# Patient Record
Sex: FEMALE | Race: WHITE | Employment: UNEMPLOYED | URBAN - METROPOLITAN AREA
[De-identification: names, ages, dates, MRNs, and addresses within clinical notes are randomized per-mention and may not be internally consistent; named-entity substitution may affect disease eponyms.]

---

## 2017-02-09 ENCOUNTER — HOSPITAL ENCOUNTER (OUTPATIENT)
Age: 42
Setting detail: OUTPATIENT SURGERY
Discharge: HOME | End: 2017-02-09
Attending: OTOLARYNGOLOGY | Admitting: OTOLARYNGOLOGY
Payer: COMMERCIAL

## 2017-02-09 ENCOUNTER — ANESTHESIA (OUTPATIENT)
Dept: SURGERY | Age: 42
End: 2017-02-09
Payer: COMMERCIAL

## 2017-02-09 ENCOUNTER — ANESTHESIA EVENT (OUTPATIENT)
Dept: SURGERY | Age: 42
End: 2017-02-09
Payer: COMMERCIAL

## 2017-02-09 VITALS
HEIGHT: 68 IN | WEIGHT: 188 LBS | TEMPERATURE: 98.7 F | RESPIRATION RATE: 12 BRPM | SYSTOLIC BLOOD PRESSURE: 133 MMHG | BODY MASS INDEX: 28.49 KG/M2 | HEART RATE: 95 BPM | OXYGEN SATURATION: 95 % | DIASTOLIC BLOOD PRESSURE: 81 MMHG

## 2017-02-09 LAB — HCG UR QL: NEGATIVE

## 2017-02-09 PROCEDURE — 87205 SMEAR GRAM STAIN: CPT | Performed by: OTOLARYNGOLOGY

## 2017-02-09 PROCEDURE — 77030013079 HC BLNKT BAIR HGGR 3M -A: Performed by: ANESTHESIOLOGY

## 2017-02-09 PROCEDURE — 77030032490 HC SLV COMPR SCD KNE COVD -B: Performed by: OTOLARYNGOLOGY

## 2017-02-09 PROCEDURE — 77030006908 HC BLD SNUS SHV MEDT -D: Performed by: OTOLARYNGOLOGY

## 2017-02-09 PROCEDURE — 77030028681 HC DRSG NSL ABSRB NASOPORE STRY -C: Performed by: OTOLARYNGOLOGY

## 2017-02-09 PROCEDURE — 88305 TISSUE EXAM BY PATHOLOGIST: CPT | Performed by: OTOLARYNGOLOGY

## 2017-02-09 PROCEDURE — 77030018836 HC SOL IRR NACL ICUM -A: Performed by: OTOLARYNGOLOGY

## 2017-02-09 PROCEDURE — 74011000250 HC RX REV CODE- 250: Performed by: OTOLARYNGOLOGY

## 2017-02-09 PROCEDURE — 76210000016 HC OR PH I REC 1 TO 1.5 HR: Performed by: OTOLARYNGOLOGY

## 2017-02-09 PROCEDURE — 87186 SC STD MICRODIL/AGAR DIL: CPT | Performed by: OTOLARYNGOLOGY

## 2017-02-09 PROCEDURE — 77030008527 HC TBNG IRR DRL MEDT -B: Performed by: OTOLARYNGOLOGY

## 2017-02-09 PROCEDURE — 87075 CULTR BACTERIA EXCEPT BLOOD: CPT | Performed by: OTOLARYNGOLOGY

## 2017-02-09 PROCEDURE — 74011250637 HC RX REV CODE- 250/637: Performed by: OTOLARYNGOLOGY

## 2017-02-09 PROCEDURE — 77030018846 HC SOL IRR STRL H20 ICUM -A: Performed by: OTOLARYNGOLOGY

## 2017-02-09 PROCEDURE — 77030006907 HC BLD SNUS SHV MEDT -C: Performed by: OTOLARYNGOLOGY

## 2017-02-09 PROCEDURE — 77030031139 HC SUT VCRL2 J&J -A: Performed by: OTOLARYNGOLOGY

## 2017-02-09 PROCEDURE — 74011250636 HC RX REV CODE- 250/636: Performed by: ANESTHESIOLOGY

## 2017-02-09 PROCEDURE — 81025 URINE PREGNANCY TEST: CPT

## 2017-02-09 PROCEDURE — 77030002888 HC SUT CHRMC J&J -A: Performed by: OTOLARYNGOLOGY

## 2017-02-09 PROCEDURE — 77030013520 HC DEV INFL BLN ACCL -B: Performed by: OTOLARYNGOLOGY

## 2017-02-09 PROCEDURE — 74011250636 HC RX REV CODE- 250/636

## 2017-02-09 PROCEDURE — 77030002966 HC SUT PDS J&J -A: Performed by: OTOLARYNGOLOGY

## 2017-02-09 PROCEDURE — 77030008355 HC SPLNT NSL EXT MEDT -B: Performed by: OTOLARYNGOLOGY

## 2017-02-09 PROCEDURE — 76060000037 HC ANESTHESIA 3 TO 3.5 HR: Performed by: OTOLARYNGOLOGY

## 2017-02-09 PROCEDURE — 76210000021 HC REC RM PH II 0.5 TO 1 HR: Performed by: OTOLARYNGOLOGY

## 2017-02-09 PROCEDURE — 77030026438 HC STYL ET INTUB CARD -A: Performed by: ANESTHESIOLOGY

## 2017-02-09 PROCEDURE — 77030032979 HC IMPL ENT ETHM SNUS PROPL IENT -G: Performed by: OTOLARYNGOLOGY

## 2017-02-09 PROCEDURE — 77030011640 HC PAD GRND REM COVD -A: Performed by: OTOLARYNGOLOGY

## 2017-02-09 PROCEDURE — 77030011267 HC ELECTRD BLD COVD -A: Performed by: OTOLARYNGOLOGY

## 2017-02-09 PROCEDURE — 74011000250 HC RX REV CODE- 250

## 2017-02-09 PROCEDURE — 76010000173 HC OR TIME 3 TO 3.5 HR INTENSV-TIER 1: Performed by: OTOLARYNGOLOGY

## 2017-02-09 PROCEDURE — 77030002974 HC SUT PLN J&J -A: Performed by: OTOLARYNGOLOGY

## 2017-02-09 PROCEDURE — 77030036884 HC CATH GD SPNPLS RELV TIP DISP KT ACCL -G1: Performed by: OTOLARYNGOLOGY

## 2017-02-09 PROCEDURE — 74011250637 HC RX REV CODE- 250/637

## 2017-02-09 PROCEDURE — 77030008684 HC TU ET CUF COVD -B: Performed by: ANESTHESIOLOGY

## 2017-02-09 DEVICE — PROPEL MINI SINUS IMPLANT
Type: IMPLANTABLE DEVICE | Site: SINUS | Status: FUNCTIONAL
Brand: PROPEL MINI

## 2017-02-09 RX ORDER — SUCCINYLCHOLINE CHLORIDE 20 MG/ML
INJECTION INTRAMUSCULAR; INTRAVENOUS AS NEEDED
Status: DISCONTINUED | OUTPATIENT
Start: 2017-02-09 | End: 2017-02-09 | Stop reason: HOSPADM

## 2017-02-09 RX ORDER — HYDROCODONE BITARTRATE AND ACETAMINOPHEN 5; 325 MG/1; MG/1
1 TABLET ORAL
Status: CANCELLED | OUTPATIENT
Start: 2017-02-09

## 2017-02-09 RX ORDER — SODIUM CHLORIDE 0.9 % (FLUSH) 0.9 %
5-10 SYRINGE (ML) INJECTION AS NEEDED
Status: DISCONTINUED | OUTPATIENT
Start: 2017-02-09 | End: 2017-02-09 | Stop reason: HOSPADM

## 2017-02-09 RX ORDER — LIDOCAINE HYDROCHLORIDE AND EPINEPHRINE 10; 10 MG/ML; UG/ML
INJECTION, SOLUTION INFILTRATION; PERINEURAL AS NEEDED
Status: DISCONTINUED | OUTPATIENT
Start: 2017-02-09 | End: 2017-02-09 | Stop reason: HOSPADM

## 2017-02-09 RX ORDER — SODIUM CHLORIDE, SODIUM LACTATE, POTASSIUM CHLORIDE, CALCIUM CHLORIDE 600; 310; 30; 20 MG/100ML; MG/100ML; MG/100ML; MG/100ML
100 INJECTION, SOLUTION INTRAVENOUS CONTINUOUS
Status: CANCELLED | OUTPATIENT
Start: 2017-02-09 | End: 2017-02-10

## 2017-02-09 RX ORDER — CLINDAMYCIN PHOSPHATE 900 MG/50ML
INJECTION INTRAVENOUS AS NEEDED
Status: DISCONTINUED | OUTPATIENT
Start: 2017-02-09 | End: 2017-02-09 | Stop reason: HOSPADM

## 2017-02-09 RX ORDER — CLINDAMYCIN PHOSPHATE 900 MG/50ML
900 INJECTION INTRAVENOUS ONCE
Status: DISCONTINUED | OUTPATIENT
Start: 2017-02-09 | End: 2017-02-09 | Stop reason: HOSPADM

## 2017-02-09 RX ORDER — AMOXICILLIN AND CLAVULANATE POTASSIUM 875; 125 MG/1; MG/1
1 TABLET, FILM COATED ORAL 2 TIMES DAILY
Qty: 14 TAB | Refills: 0 | Status: SHIPPED | OUTPATIENT
Start: 2017-02-09 | End: 2022-11-01

## 2017-02-09 RX ORDER — ONDANSETRON 2 MG/ML
INJECTION INTRAMUSCULAR; INTRAVENOUS AS NEEDED
Status: DISCONTINUED | OUTPATIENT
Start: 2017-02-09 | End: 2017-02-09 | Stop reason: HOSPADM

## 2017-02-09 RX ORDER — MIDAZOLAM HYDROCHLORIDE 1 MG/ML
INJECTION, SOLUTION INTRAMUSCULAR; INTRAVENOUS AS NEEDED
Status: DISCONTINUED | OUTPATIENT
Start: 2017-02-09 | End: 2017-02-09 | Stop reason: HOSPADM

## 2017-02-09 RX ORDER — SODIUM CHLORIDE 0.9 % (FLUSH) 0.9 %
5-10 SYRINGE (ML) INJECTION EVERY 8 HOURS
Status: DISCONTINUED | OUTPATIENT
Start: 2017-02-09 | End: 2017-02-09 | Stop reason: HOSPADM

## 2017-02-09 RX ORDER — SODIUM CHLORIDE 0.9 % (FLUSH) 0.9 %
5-10 SYRINGE (ML) INJECTION AS NEEDED
Status: CANCELLED | OUTPATIENT
Start: 2017-02-09

## 2017-02-09 RX ORDER — ROPIVACAINE HYDROCHLORIDE 5 MG/ML
150 INJECTION, SOLUTION EPIDURAL; INFILTRATION; PERINEURAL AS NEEDED
Status: DISCONTINUED | OUTPATIENT
Start: 2017-02-09 | End: 2017-02-09 | Stop reason: HOSPADM

## 2017-02-09 RX ORDER — HYDROCODONE BITARTRATE AND ACETAMINOPHEN 7.5; 325 MG/1; MG/1
2 TABLET ORAL
Qty: 30 TAB | Refills: 0 | Status: SHIPPED | OUTPATIENT
Start: 2017-02-09 | End: 2022-11-01

## 2017-02-09 RX ORDER — DEXAMETHASONE SODIUM PHOSPHATE 4 MG/ML
INJECTION, SOLUTION INTRA-ARTICULAR; INTRALESIONAL; INTRAMUSCULAR; INTRAVENOUS; SOFT TISSUE AS NEEDED
Status: DISCONTINUED | OUTPATIENT
Start: 2017-02-09 | End: 2017-02-09 | Stop reason: HOSPADM

## 2017-02-09 RX ORDER — FENTANYL CITRATE 50 UG/ML
25 INJECTION, SOLUTION INTRAMUSCULAR; INTRAVENOUS
Status: DISCONTINUED | OUTPATIENT
Start: 2017-02-09 | End: 2017-02-10 | Stop reason: HOSPADM

## 2017-02-09 RX ORDER — SODIUM CHLORIDE, SODIUM LACTATE, POTASSIUM CHLORIDE, CALCIUM CHLORIDE 600; 310; 30; 20 MG/100ML; MG/100ML; MG/100ML; MG/100ML
100 INJECTION, SOLUTION INTRAVENOUS CONTINUOUS
Status: DISCONTINUED | OUTPATIENT
Start: 2017-02-09 | End: 2017-02-10 | Stop reason: HOSPADM

## 2017-02-09 RX ORDER — MIDAZOLAM HYDROCHLORIDE 1 MG/ML
1 INJECTION, SOLUTION INTRAMUSCULAR; INTRAVENOUS AS NEEDED
Status: DISCONTINUED | OUTPATIENT
Start: 2017-02-09 | End: 2017-02-09 | Stop reason: HOSPADM

## 2017-02-09 RX ORDER — HYDROMORPHONE HYDROCHLORIDE 1 MG/ML
0.2 INJECTION, SOLUTION INTRAMUSCULAR; INTRAVENOUS; SUBCUTANEOUS
Status: DISCONTINUED | OUTPATIENT
Start: 2017-02-09 | End: 2017-02-10 | Stop reason: HOSPADM

## 2017-02-09 RX ORDER — SODIUM CHLORIDE 0.9 % (FLUSH) 0.9 %
5-10 SYRINGE (ML) INJECTION AS NEEDED
Status: DISCONTINUED | OUTPATIENT
Start: 2017-02-09 | End: 2017-02-10 | Stop reason: HOSPADM

## 2017-02-09 RX ORDER — PROPOFOL 10 MG/ML
INJECTION, EMULSION INTRAVENOUS AS NEEDED
Status: DISCONTINUED | OUTPATIENT
Start: 2017-02-09 | End: 2017-02-09 | Stop reason: HOSPADM

## 2017-02-09 RX ORDER — HYDROCODONE BITARTRATE AND ACETAMINOPHEN 7.5; 325 MG/1; MG/1
1 TABLET ORAL ONCE
Status: COMPLETED | OUTPATIENT
Start: 2017-02-09 | End: 2017-02-09

## 2017-02-09 RX ORDER — SODIUM CHLORIDE 0.9 % (FLUSH) 0.9 %
5-10 SYRINGE (ML) INJECTION EVERY 8 HOURS
Status: CANCELLED | OUTPATIENT
Start: 2017-02-09

## 2017-02-09 RX ORDER — ROCURONIUM BROMIDE 10 MG/ML
INJECTION, SOLUTION INTRAVENOUS AS NEEDED
Status: DISCONTINUED | OUTPATIENT
Start: 2017-02-09 | End: 2017-02-09 | Stop reason: HOSPADM

## 2017-02-09 RX ORDER — HYDROCODONE BITARTRATE AND ACETAMINOPHEN 7.5; 325 MG/1; MG/1
TABLET ORAL
Status: COMPLETED
Start: 2017-02-09 | End: 2017-02-09

## 2017-02-09 RX ORDER — SODIUM CHLORIDE, SODIUM LACTATE, POTASSIUM CHLORIDE, CALCIUM CHLORIDE 600; 310; 30; 20 MG/100ML; MG/100ML; MG/100ML; MG/100ML
INJECTION, SOLUTION INTRAVENOUS
Status: DISCONTINUED | OUTPATIENT
Start: 2017-02-09 | End: 2017-02-09 | Stop reason: HOSPADM

## 2017-02-09 RX ORDER — ONDANSETRON 8 MG/1
8 TABLET, ORALLY DISINTEGRATING ORAL
Qty: 5 TAB | Refills: 1 | Status: SHIPPED | OUTPATIENT
Start: 2017-02-09 | End: 2022-11-01

## 2017-02-09 RX ORDER — SODIUM CHLORIDE, SODIUM LACTATE, POTASSIUM CHLORIDE, CALCIUM CHLORIDE 600; 310; 30; 20 MG/100ML; MG/100ML; MG/100ML; MG/100ML
100 INJECTION, SOLUTION INTRAVENOUS CONTINUOUS
Status: DISCONTINUED | OUTPATIENT
Start: 2017-02-09 | End: 2017-02-09 | Stop reason: HOSPADM

## 2017-02-09 RX ORDER — OXYMETAZOLINE HCL 0.05 %
SPRAY, NON-AEROSOL (ML) NASAL AS NEEDED
Status: DISCONTINUED | OUTPATIENT
Start: 2017-02-09 | End: 2017-02-09 | Stop reason: HOSPADM

## 2017-02-09 RX ORDER — BACITRACIN ZINC 500 UNIT/G
OINTMENT (GRAM) TOPICAL AS NEEDED
Status: DISCONTINUED | OUTPATIENT
Start: 2017-02-09 | End: 2017-02-09 | Stop reason: HOSPADM

## 2017-02-09 RX ORDER — DIPHENHYDRAMINE HYDROCHLORIDE 50 MG/ML
12.5 INJECTION, SOLUTION INTRAMUSCULAR; INTRAVENOUS AS NEEDED
Status: DISCONTINUED | OUTPATIENT
Start: 2017-02-09 | End: 2017-02-10 | Stop reason: HOSPADM

## 2017-02-09 RX ORDER — MORPHINE SULFATE 10 MG/ML
2 INJECTION, SOLUTION INTRAMUSCULAR; INTRAVENOUS
Status: DISCONTINUED | OUTPATIENT
Start: 2017-02-09 | End: 2017-02-10 | Stop reason: HOSPADM

## 2017-02-09 RX ORDER — LIDOCAINE HYDROCHLORIDE 10 MG/ML
0.1 INJECTION, SOLUTION EPIDURAL; INFILTRATION; INTRACAUDAL; PERINEURAL AS NEEDED
Status: DISCONTINUED | OUTPATIENT
Start: 2017-02-09 | End: 2017-02-09 | Stop reason: HOSPADM

## 2017-02-09 RX ORDER — FENTANYL CITRATE 50 UG/ML
INJECTION, SOLUTION INTRAMUSCULAR; INTRAVENOUS AS NEEDED
Status: DISCONTINUED | OUTPATIENT
Start: 2017-02-09 | End: 2017-02-09 | Stop reason: HOSPADM

## 2017-02-09 RX ORDER — LABETALOL HYDROCHLORIDE 5 MG/ML
INJECTION, SOLUTION INTRAVENOUS AS NEEDED
Status: DISCONTINUED | OUTPATIENT
Start: 2017-02-09 | End: 2017-02-09 | Stop reason: HOSPADM

## 2017-02-09 RX ORDER — MIDAZOLAM HYDROCHLORIDE 1 MG/ML
0.5 INJECTION, SOLUTION INTRAMUSCULAR; INTRAVENOUS
Status: DISCONTINUED | OUTPATIENT
Start: 2017-02-09 | End: 2017-02-10 | Stop reason: HOSPADM

## 2017-02-09 RX ORDER — MORPHINE SULFATE 2 MG/ML
2 INJECTION, SOLUTION INTRAMUSCULAR; INTRAVENOUS
Status: CANCELLED | OUTPATIENT
Start: 2017-02-09

## 2017-02-09 RX ORDER — FENTANYL CITRATE 50 UG/ML
50 INJECTION, SOLUTION INTRAMUSCULAR; INTRAVENOUS AS NEEDED
Status: DISCONTINUED | OUTPATIENT
Start: 2017-02-09 | End: 2017-02-09 | Stop reason: HOSPADM

## 2017-02-09 RX ADMIN — FENTANYL CITRATE 50 MCG: 50 INJECTION, SOLUTION INTRAMUSCULAR; INTRAVENOUS at 15:51

## 2017-02-09 RX ADMIN — FENTANYL CITRATE 25 MCG: 50 INJECTION, SOLUTION INTRAMUSCULAR; INTRAVENOUS at 19:35

## 2017-02-09 RX ADMIN — HYDROCODONE BITARTRATE AND ACETAMINOPHEN 1 TABLET: 7.5; 325 TABLET ORAL at 21:16

## 2017-02-09 RX ADMIN — LABETALOL HYDROCHLORIDE 5 MG: 5 INJECTION, SOLUTION INTRAVENOUS at 16:06

## 2017-02-09 RX ADMIN — FENTANYL CITRATE 50 MCG: 50 INJECTION, SOLUTION INTRAMUSCULAR; INTRAVENOUS at 16:02

## 2017-02-09 RX ADMIN — PROPOFOL 200 MG: 10 INJECTION, EMULSION INTRAVENOUS at 15:51

## 2017-02-09 RX ADMIN — ONDANSETRON 4 MG: 2 INJECTION INTRAMUSCULAR; INTRAVENOUS at 18:37

## 2017-02-09 RX ADMIN — FENTANYL CITRATE 25 MCG: 50 INJECTION, SOLUTION INTRAMUSCULAR; INTRAVENOUS at 19:56

## 2017-02-09 RX ADMIN — ROCURONIUM BROMIDE 10 MG: 10 INJECTION, SOLUTION INTRAVENOUS at 15:51

## 2017-02-09 RX ADMIN — CLINDAMYCIN PHOSPHATE 900 MG: 900 INJECTION INTRAVENOUS at 16:26

## 2017-02-09 RX ADMIN — DEXAMETHASONE SODIUM PHOSPHATE 8 MG: 4 INJECTION, SOLUTION INTRA-ARTICULAR; INTRALESIONAL; INTRAMUSCULAR; INTRAVENOUS; SOFT TISSUE at 16:12

## 2017-02-09 RX ADMIN — ROCURONIUM BROMIDE 40 MG: 10 INJECTION, SOLUTION INTRAVENOUS at 16:25

## 2017-02-09 RX ADMIN — SODIUM CHLORIDE, SODIUM LACTATE, POTASSIUM CHLORIDE, CALCIUM CHLORIDE: 600; 310; 30; 20 INJECTION, SOLUTION INTRAVENOUS at 15:30

## 2017-02-09 RX ADMIN — FENTANYL CITRATE 25 MCG: 50 INJECTION, SOLUTION INTRAMUSCULAR; INTRAVENOUS at 19:48

## 2017-02-09 RX ADMIN — FENTANYL CITRATE 25 MCG: 50 INJECTION, SOLUTION INTRAMUSCULAR; INTRAVENOUS at 20:03

## 2017-02-09 RX ADMIN — MIDAZOLAM HYDROCHLORIDE 2 MG: 1 INJECTION, SOLUTION INTRAMUSCULAR; INTRAVENOUS at 15:51

## 2017-02-09 RX ADMIN — SODIUM CHLORIDE, SODIUM LACTATE, POTASSIUM CHLORIDE, AND CALCIUM CHLORIDE 100 ML/HR: 600; 310; 30; 20 INJECTION, SOLUTION INTRAVENOUS at 15:39

## 2017-02-09 RX ADMIN — MORPHINE SULFATE 2 MG: 10 INJECTION, SOLUTION INTRAVENOUS at 20:18

## 2017-02-09 RX ADMIN — SUCCINYLCHOLINE CHLORIDE 140 MG: 20 INJECTION INTRAMUSCULAR; INTRAVENOUS at 15:51

## 2017-02-09 RX ADMIN — FENTANYL CITRATE 100 MCG: 50 INJECTION, SOLUTION INTRAMUSCULAR; INTRAVENOUS at 16:26

## 2017-02-09 NOTE — IP AVS SNAPSHOT
[address] 
[phone] Patient: Antionette Lynn MRN: [id number] :1975 You are allergic to the following Allergen Reactions Tape (Adhesive) Rash States has problem with steri strips Betadine (Povidone-Iodine) Rash Bactrim (Sulfamethoprim Ds) Rash Recent Documentation Height Weight BMI OB Status Smoking Status 1.727 m 85.3 kg 28.59 kg/m2 Having regular periods Never Smoker Unresulted Labs Order Current Status CULTURE, ANAEROBIC In process CULTURE, WOUND W GRAM STAIN In process Emergency Contacts Name Discharge Info Relation Home Work Mobile Ever Shepard DISCHARGE CAREGIVER [3] Spouse [3] [phone] About your hospitalization You were admitted on:  2017 [facility] on:  2017 Unit phone number:  [phone] Why you were hospitalized Your primary diagnosis was:  Not on File Providers Seen During Your Hospitalizations Provider Role Specialty Primary office phone Kvng Greenberg MD Attending Provider Otolaryngology [phone] Your Primary Care Physician (PCP) Primary Care Physician Office Phone Office Fax Mindy Melvin [phone] Follow-up Information Follow up With Details Comments Contact Info Jermaine Powell MD   [address] [facility] [address] 
[phone] Kvng Greenberg MD Schedule an appointment as soon as possible for a visit in 1 week(s)  [address] [place] [address] 
[phone] Current Discharge Medication List  
  
START taking these medications Dose & Instructions Dispensing Information Comments Morning Noon Evening Bedtime  
 amoxicillin-clavulanate 875-125 mg per tablet Commonly known as:  AUGMENTIN  
   
 Your next dose is: Today, Tomorrow Other:  _________ Dose:  1 Tab Take 1 Tab by mouth two (2) times a day. Indications: post surgical  
 Quantity:  14 Tab Refills:  0 HYDROcodone-acetaminophen 7.5-325 mg per tablet Commonly known as:  Juanetta Rasp Your next dose is: Today, Tomorrow Other:  _________ Dose:  2 Tab Take 2 Tabs by mouth every six (6) hours as needed for Pain for up to 30 doses. Max Daily Amount: 8 Tabs. Indications: Pain Quantity:  30 Tab Refills:  0  
     
   
   
   
  
 ondansetron 8 mg disintegrating tablet Commonly known as:  ZOFRAN ODT Your next dose is: Today, Tomorrow Other:  _________ Dose:  8 mg Take 1 Tab by mouth every eight (8) hours as needed for Nausea. Quantity:  5 Tab Refills:  1 CONTINUE these medications which have NOT CHANGED Dose & Instructions Dispensing Information Comments Morning Noon Evening Bedtime  
 estrogens (conjugated) 0.45 mg tablet Commonly known as:  PREMARIN Your next dose is: Today, Tomorrow Other:  _________ Dose:  0.45 mg Take 0.45 mg by mouth nightly. TAKES FOR 25 DAYS EVERY MONTH Refills:  0  
     
   
   
   
  
 progesterone 100 mg capsule Commonly known as:  PROMETRIUM Your next dose is: Today, Tomorrow Other:  _________ Dose:  100 mg Take 100 mg by mouth. ONLY TAKES FOR 10 DAYS PER MONTH. TAKES AT BEDTIME. Refills:  0  
     
   
   
   
  
 ZOLOFT 100 mg tablet Generic drug:  sertraline Your next dose is: Today, Tomorrow Other:  _________ Dose:  200 mg Take 200 mg by mouth nightly. Refills:  0 ZyrTEC 10 mg Cap Generic drug:  Cetirizine Your next dose is: Today, Tomorrow Other:  _________ Dose:  10 mg Take 10 mg by mouth daily. Refills:  0 Where to Get Your Medications Information on where to get these meds will be given to you by the nurse or doctor. ! Ask your nurse or doctor about these medications  
  amoxicillin-clavulanate 875-125 mg per tablet HYDROcodone-acetaminophen 7.5-325 mg per tablet  
 ondansetron 8 mg disintegrating tablet Discharge Instructions Endoscopic Sinus Surgery: What to Expect at [facility] Your Recovery You will have a drip pad under your nose to collect mucus and blood. Change it only when it bleeds through. You may have to do this every hour for 24 hours after surgery. You may have some swelling of your nose, upper lip, cheeks, or around your eyes. Your nose may be sore and will bleed. You may feel \"stuffed up\" like you have a bad head cold. This will last for several days after surgery. The tip of your nose and your upper lip and gums may be numb. Feeling will return in a few weeks to a few months. Your sense of smell will not be as good after surgery. It will improve and probably return to normal in 1 to 2 months. You will probably be able to return to work or school in about 1 week and to your normal routine in about 3 weeks. However, this varies with your job and the extent of your surgery. Most people feel normal in 1 to 2 months. You will have to visit your doctor regularly for 3 to 4 months after your surgery. Your doctor will check to see that your sinuses are healing well. This care sheet gives you a general idea about how long it will take for you to recover. But each person recovers at a different pace. Follow the steps below to get better as quickly as possible. How can you care for yourself at home? Activity · Rest when you feel tired. Getting enough sleep will help you recover. Do not lie flat. Raise your head with three or four pillows. This can reduce swelling. Try to sleep on your back during the month after surgery. You can also sleep in a reclining chair. · Try to walk each day. Start by walking a little more than you did the day before. Bit by bit, increase the amount you walk. Walking boosts blood flow and helps prevent pneumonia and constipation. Also, try to sit and stand as much as you can. · For 1 week, try not to bend over or lift anything heavier than 10 pounds. This may include a child, heavy grocery bags and milk containers, a heavy briefcase or backpack, cat litter or dog food bags, or a vacuum . · You can take a shower or bath. Use lukewarm, not hot water. Avoid swimming for 6 weeks. · Avoid sawdust, chemicals, and excessive dust for 4 weeks. · Avoid strenuous activities, such as biking, jogging, weight lifting, or aerobic exercise, for 1 week and then ease back into these activities over 2 to 3 weeks. · You may drive when you are no longer taking prescription pain medicine and feel up to it. · You will probably be able to return to work or school in about 1 week and to your normal routine in about 3 weeks. However, this varies with your job and the extent of your surgery. Diet · You can eat your normal diet. If your stomach is upset, try bland, low-fat foods like plain rice, broiled chicken, toast, and yogurt. · You may notice that your bowel movements are not regular right after your surgery. This is common. Try to avoid constipation and straining with bowel movements. You may want to take a fiber supplement every day. If you have not had a bowel movement after a couple of days, ask your doctor about taking a mild laxative. Medicines · Your doctor will tell you if and when you can restart your medicines. He or she will also give you instructions about taking any new medicines. · If you take blood thinners, such as warfarin (Coumadin), clopidogrel (Plavix), or aspirin, be sure to talk to your doctor. He or she will tell you if and when to start taking those medicines again.  Make sure that you understand exactly what your doctor wants you to do. · Do not take aspirin, aspirin-containing medicines, or anti-inflammatory medicines such as ibuprofen (Advil, Motrin) or naproxen (Aleve) for 3 weeks following surgery unless your doctor says it is okay. · Take pain medicines exactly as directed. ¨ If the doctor gave you a prescription medicine for pain, take it as prescribed. ¨ Do not take two or more pain medicines at the same time unless the doctor told you to. Many pain medicines have acetaminophen, which is Tylenol. Too much acetaminophen (Tylenol) can be harmful. · If your doctor prescribed antibiotics, take them as directed. Do not stop taking them just because you feel better. You need to take the full course of antibiotics. · If you think your pain medicine is making you sick to your stomach: 
¨ Take your medicine after meals (unless your doctor has told you not to). ¨ Ask your doctor for a different pain medicine. Incision care · You probably will not have an incision (cut). You will have a drip pad under your nose to collect blood. Change it only when it has bled through. You may have to do this every hour for 24 hours after surgery. When bleeding stops, you can remove it. · If you have packing in your nose, leave it in. Your doctor will take it out. Ice and elevation · To help with swelling and pain, put ice or a cold pack on your nose for 10 to 20 minutes at a time. Put a thin cloth between the ice and your skin. · Do not sleep flat. Sleep with your head raised up. You can also sleep in a reclining chair. Other instructions · Do not blow your nose for 2 weeks. · Do not put anything into your nose. · If you must sneeze, open your mouth and sneeze naturally. · Keep your mouth clean. Rinse your mouth with salt water or an alcohol-free mouthwash after each meal and before bedtime.  
· After any packing is removed, use saline (saltwater) nasal washes to help keep your nasal passages open and wash out mucus and bacteria. You can buy saline nose drops at a grocery store or drugstore. · You can wear your glasses when you wish. Do not wear contacts until the day after the surgery. · Do not travel by airplane for at least 2 weeks. Your sinuses are still healing, and the changes in air pressure can affect them. Follow-up care is a key part of your treatment and safety. Be sure to make and go to all appointments, and call your doctor if you are having problems. It's also a good idea to know your test results and keep a list of the medicines you take. When should you call for help? Call 911 anytime you think you may need emergency care. For example, call if: 
· You passed out (lost consciousness). · You have severe trouble breathing. Call your doctor now or seek immediate medical care if: · The dressing soaks through with blood and needs to be changed more than every 15 minutes. · You have a fever with a stiff neck or a severe headache. · You are sensitive to light, or you feel very sleepy or confused. · You have pain that does not get better after you take pain medicine. · You have a fever over 100°F. 
· You have double or blurred vision, cannot close your eyes, or have eye pain. Watch closely for changes in your health, and be sure to contact your doctor if: 
· You do not have a bowel movement after taking a laxative. Where can you learn more? Go to http://jovanny-jesus.info/. Enter V355 in the search box to learn more about \"Endoscopic Sinus Surgery: What to Expect at Home. \" Current as of: July 29, 2016 Content Version: 11.1 © 1873-4499 [employer]. Care instructions adapted under license by [employer] (which disclaims liability or warranty for this information).  If you have questions about a medical condition or this instruction, always ask your healthcare professional. [employer] disclaims any warranty or liability for your use of this information. Rhinoplasty: What to Expect at [facility] Your Recovery Rhinoplasty is surgery to reshape your nose. It can be done to improve your appearance, fix a birth defect, or help you breathe better. You may have stitches or staples in your cuts (incisions). Stitches inside your nose and mouth will usually dissolve on their own. If you have staples, your doctor will take these out in the first week. A bandage will cover your nose. You may have a plastic or plaster splint to protect and help keep the new shape of your nose. You may have a \"nasal drip pad\" under your nostrils to collect any blood that may drip from your nose. Your doctor will show you how to change the pad as needed. You may have packing material inside your nose to reduce bleeding and swelling. Packing and the nasal drip pad will be removed within 2 days after surgery. The splint will be removed in about a week. Your nose will be bruised and swollen, and you may get dark bruises around your eyes. The swelling may get worse before it gets better. Most of the swelling should go away in 3 to 4 weeks. You will have some pain in your nose, and you may have a headache. Your nose may be stuffy and you may have trouble breathing for a short time. The skin on the tip of your nose may be numb. You may have some itching or shooting pain as the feeling returns. If bones were broken during your surgery, you will need to avoid injury to your nose for about 3 months. In 3 to 4 weeks, you should have a good idea as to what your nose will look like. It can take up to a year to see the final result. This care sheet gives you a general idea about how long it will take for you to recover. But each person recovers at a different pace. Follow the steps below to feel better as quickly as possible. How can you care for yourself at home? Activity · Rest when you feel tired. Getting enough sleep will help you recover. · Keep your head raised for several days after surgery. Sleep with your head up by using 2 or 3 pillows. · Try to walk each day. Start by walking a little more than you did the day before. Bit by bit, increase the amount you walk. · You will probably need to take about 1 week off from work. It depends on the type of work you do and how you feel. · Avoid strenuous activities, such as bicycle riding, jogging, weight lifting, or aerobic exercise, for 2 to 3 weeks or until your doctor says it is okay. · Ask your doctor when you can drive again. · Do not blow your nose for at least 1 week after surgery. Wipe your nose gently with a tissue. If you need to sneeze, do it with your mouth open. · Ask your doctor when it'll be okay for you to bend over. · Do not rub your nose for 8 weeks. Use sunblock on your nose and wear a hat with a brim to avoid getting a sunburn. Put on sunblock or makeup gently. · Do not swim for a week. Diet · You may notice that your bowel movements are not regular right after your surgery. This is common. Try to avoid constipation and straining with bowel movements. You may want to take a fiber supplement every day. If you have not had a bowel movement after a couple of days, ask your doctor about taking a mild laxative. Medicines · Your doctor will tell you if and when you can restart your medicines. He or she will also give you instructions about taking any new medicines. · If you take blood thinners, such as warfarin (Coumadin), clopidogrel (Plavix), or aspirin, be sure to talk to your doctor. He or she will tell you if and when to start taking those medicines again. Make sure that you understand exactly what your doctor wants you to do. · Be safe with medicines. Take pain medicines exactly as directed. ¨ If the doctor gave you a prescription medicine for pain, take it as prescribed. ¨ If you are not taking a prescription pain medicine, ask your doctor if you can take an over-the-counter medicine. · If your doctor prescribed antibiotics, take them as directed. Do not stop taking them just because you feel better. You need to take the full course of antibiotics. · If you think your pain medicine is making you sick to your stomach: 
¨ Take your medicine after meals (unless your doctor has told you not to). ¨ Ask your doctor for a different pain medicine. Incision care · After the stitches or staples are out, you may wash the incision with soap and water and gently dry the area. · If you have strips of tape on the incision the doctor made, leave the tape on for a week or until it falls off. Or follow your doctor's instructions for removing the tape. Other instructions · Put ice or a cold pack on your nose for 10 to 20 minutes at a time. Try to do this every 1 to 2 hours for the next 3 days (when you are awake) or until the swelling goes down. A bag of frozen peas or corn works well for this, because it molds to the shape of your face. Put a thin cloth between the ice and your skin. · Do not set glasses on your nose for 4 weeks. Instead, wrap a piece of tape around the bridge of the glasses and attach the tape to your forehead. · For 1 week, avoid wearing clothes that you pull over your head. Follow-up care is a key part of your treatment. Be sure to make and go to all appointments, and call your doctor if you are having problems. It's also a good idea to know your test results and keep a list of the medicines you take. When should you call for help? Call 911 anytime you think you may need emergency care. For example, call if: 
· You passed out (lost consciousness). · You have severe trouble breathing. · You have sudden chest pain and shortness of breath, or you cough up blood. Call your doctor now or seek immediate medical care if: · You have pain that does not get better after you take pain medicine. · You have a fever over 100°F. 
· You have loose stitches, or your incision comes open. · You have bright red blood that repeatedly soaks through the nasal drip pad, or you feel that you are swallowing a lot of blood. · You have signs of infection, such as red streaks or pus from your incision. · You have a sudden increase in swelling. · You are sick to your stomach or cannot keep fluids down. Watch closely for any changes in your health, and be sure to contact your doctor if: 
· You do not have a bowel movement after taking a laxative. Where can you learn more? Go to http://jovanny-jesus.info/. Enter U430 in the search box to learn more about \"Rhinoplasty: What to Expect at Home. \" Current as of: February 5, 2016 Content Version: 11.1 © 3955-5234 [employer]. Care instructions adapted under license by [employer] (which disclaims liability or warranty for this information). If you have questions about a medical condition or this instruction, always ask your healthcare professional. [place] [address] any warranty or liability for your use of this information. DISCHARGE SUMMARY from Nurse The following personal items are in your possession at time of discharge: 
 
Dental Appliances: Other (comment) (upper front 4 crowns, bottom right crown molar) Clothing: Other (comment) (clothing) PATIENT INSTRUCTIONS: 
 
 
F-face looks uneven A-arms unable to move or move unevenly S-speech slurred or non-existent T-time-call 911 as soon as signs and symptoms begin-DO NOT go  
 Back to bed or wait to see if you get better-TIME IS BRAIN. Warning Signs of HEART ATTACK Call 911 if you have these symptoms: 
? Chest discomfort. Most heart attacks involve discomfort in the center of the chest that lasts more than a few minutes, or that goes away and comes back. It can feel like uncomfortable pressure, squeezing, fullness, or pain. ? Discomfort in other areas of the upper body. Symptoms can include pain or discomfort in one or both arms, the back, neck, jaw, or stomach. ? Shortness of breath with or without chest discomfort. ? Other signs may include breaking out in a cold sweat, nausea, or lightheadedness. Don't wait more than five minutes to call [address]! Fast action can save your life. Calling 911 is almost always the fastest way to get lifesaving treatment. Emergency Medical Services staff can begin treatment when they arrive  up to an hour sooner than if someone gets to the hospital by car. The discharge information has been reviewed with the {PATIENT PARENT GUARDIAN:41879}. The {PATIENT PARENT GUARDIAN:91501} verbalized understanding. Discharge medications reviewed with the {Dishcarge meds reviewed RNET:57298} and appropriate educational materials and side effects teaching were provided. Discharge Orders None Introducing [facility]! Garland Wells introduces [employer] patient portal. Now you can access parts of your medical record, email your doctor's office, and request medication refills online. 1. In your internet browser, go to [email or web address] 2. Click on the First Time User? Click Here link in the Sign In box. You will see the New Member Sign Up page. 3. Enter your [employer] Access Code exactly as it appears below. You will not need to use this code after youve completed the sign-up process. If you do not sign up before the expiration date, you must request a new code. · [employer] Access Code: [email or web address] Expires: 5/7/2017 11:28 AM 
 
4. Enter the last four digits of your Social Security Number (xxxx) and Date of Birth (mm/dd/yyyy) as indicated and click Submit. You will be taken to the next sign-up page. 5. Create a [employer] ID. This will be your [employer] login ID and cannot be changed, so think of one that is secure and easy to remember. 6. Create a [employer] password. You can change your password at any time. 7. Enter your Password Reset Question and Answer. This can be used at a later time if you forget your password. 8. Enter your e-mail address. You will receive e-mail notification when new information is available in [address] 9. Click Sign Up. You can now view and download portions of your medical record. 10. Click the Download Summary menu link to download a portable copy of your medical information. If you have questions, please visit the Frequently Asked Questions section of the [employer] website. Remember, [employer] is NOT to be used for urgent needs. For medical emergencies, dial 911. Now available from your iPhone and Android! General Information Please provide this summary of care documentation to your next provider. Patient Signature:  ____________________________________________________________ Date:  ____________________________________________________________  
  
Yung Mancini Provider Signature:  ____________________________________________________________ Date:  ____________________________________________________________

## 2017-02-09 NOTE — ANESTHESIA PREPROCEDURE EVALUATION
Anesthetic History   No history of anesthetic complications            Review of Systems / Medical History  Patient summary reviewed, nursing notes reviewed and pertinent labs reviewed    Pulmonary  Within defined limits                 Neuro/Psych   Within defined limits           Cardiovascular  Within defined limits                     GI/Hepatic/Renal  Within defined limits              Endo/Other  Within defined limits           Other Findings              Physical Exam    Airway  Mallampati: II  TM Distance: > 6 cm  Neck ROM: normal range of motion   Mouth opening: Normal     Cardiovascular  Regular rate and rhythm,  S1 and S2 normal,  no murmur, click, rub, or gallop             Dental    Dentition: Bridges     Pulmonary  Breath sounds clear to auscultation               Abdominal  GI exam deferred       Other Findings            Anesthetic Plan    ASA: 2  Anesthesia type: general          Induction: Intravenous  Anesthetic plan and risks discussed with: Patient

## 2017-02-10 NOTE — BRIEF OP NOTE
BRIEF OPERATIVE NOTE    Date of Procedure: 2/9/2017   Preoperative Diagnosis: CHRONIC SINUSITIS, POLYP OF NASAL CAVITY, COMPLETE UNILATERAL CLEFT PALATE WITH CLEFT LIP  Postoperative Diagnosis: CHRONIC SINUSITIS, POLYP OF NASAL CAVITY, COMPLETE UNILATERAL CLEFT PALATE     Procedure(s):  FUNCTIONAL ENDOSCOPIC SINUS SURGERY WITH LANDMARK, CLEFT LIP REPAIR SCAR REVISION, FUNCTIONAL RHINOPLASTY  SEPTORHINOPLASTY  Surgeon(s) and Role:     * Curly Benedict MD - Primary            Surgical Staff:  Circ-1: Maryse Fierro RN  Circ-Relief: Aleshia Gallegos RN  Scrub RN-1: Loan Durham RN  Scrub RN-Relief: Miller Hong RN  Surg Asst-1: Abelino Tovar  Surg Asst-2: [place] Cresencio  Float Staff: Miller Hong RN  Event Time In   Incision Start 1629   Incision Close 1907     Anesthesia: General   Estimated Blood Loss: 30ml  Specimens:   ID Type Source Tests Collected by Time Destination   1 : Right nasal biopsy Fresh Nasal  Curly Benedict MD 2/9/2017 1646 Pathology   2 : Right frontal sinus nasal polyp Fresh Nasal  Curly Benedict MD 2/9/2017 1647 Pathology   1 : Right Maxillary Sinus Sinus Maxillary Sinus CULTURE, ANAEROBIC AND AEROBIC Curly Benedict MD 2/9/2017 1634 Microbiology      Findings: polyp disease   Complications: 0  Implants:   Implant Name Type Inv.  Item Serial No.  Lot No. LRB No. Used Action   IMPL ENT MINI Vianne Signs --  - SNA  IMPL ENT MINI Vianne Signs --  NA INTERSECT ENT [id number] Right 1 Implanted   IMPL ENT MINI MOMETASN FUROATE --  - SNA   IMPL ENT MINI Vianne Signs --  NA INTERSECT ENT [id number] Left 1 Implanted

## 2017-02-10 NOTE — ROUTINE PROCESS
Patient: Kyra Sharp MRN: [id number]  SSN: [id number]   YOB: 1975  Age: 46 y.o. Sex: female     Patient is status post Procedure(s):  FUNCTIONAL ENDOSCOPIC SINUS SURGERY WITH LANDMARK, CLEFT LIP REPAIR SCAR REVISION, FUNCTIONAL RHINOPLASTY  SEPTORHINOPLASTY. Surgeon(s) and Role:     * Nash Yeboah MD - Primary    Local/Dose/Irrigation:  See [facility]                  Peripheral IV 02/09/17 Right Antecubital (Active)                           Dressing/Packing:  Wound Ear-DRESSING TYPE: Cotton ball(s); Adhesive wound closure strips (Steri-Strips) (bacitracin to cotton ball-placed in right ear; paper tape un) (02/09/17 1566)  Splint/Cast:  ]    Other:

## 2017-02-10 NOTE — ANESTHESIA POSTPROCEDURE EVALUATION
Post-Anesthesia Evaluation and Assessment    Patient: Clearance Aas MRN: [id number]  SSN: [id number]    YOB: 1975  Age: 46 y.o. Sex: female       Cardiovascular Function/Vital Signs  Visit Vitals    /81    Pulse 95    Temp 37.1 °C (98.7 °F)    Resp 12    Ht 5' 8\" (1.727 m)    Wt 85.3 kg (188 lb)    SpO2 95%    BMI 28.59 kg/m2       Patient is status post general anesthesia for Procedure(s):  FUNCTIONAL ENDOSCOPIC SINUS SURGERY WITH LANDMARK, CLEFT LIP REPAIR SCAR REVISION, FUNCTIONAL RHINOPLASTY  SEPTORHINOPLASTY. Nausea/Vomiting: None    Postoperative hydration reviewed and adequate. Pain:  Pain Scale 1: Numeric (0 - 10) (02/09/17 2137)  Pain Intensity 1: 4 (02/09/17 2137)   Managed    Neurological Status:   Neuro (WDL): Exceptions to WDL (02/09/17 2036)  Neuro  Neurologic State: Alert;Drowsy (02/09/17 2036)  Orientation Level: Oriented X4 (02/09/17 2036)  Cognition: Follows commands (02/09/17 2036)  Speech: Clear (02/09/17 2036)  LUE Motor Response: Purposeful (02/09/17 2036)  LLE Motor Response: Purposeful (02/09/17 2036)  RUE Motor Response: Purposeful (02/09/17 2036)  RLE Motor Response: Purposeful (02/09/17 2036)   At baseline    Mental Status and Level of Consciousness: Arousable    Pulmonary Status:   O2 Device: Room air (02/09/17 2137)   Adequate oxygenation and airway patent    Complications related to anesthesia: None    Post-anesthesia assessment completed.  No concerns    Signed By: Bernie Capone MD     February 9, 2017

## 2017-02-10 NOTE — DISCHARGE INSTRUCTIONS
Endoscopic Sinus Surgery: What to Expect at [address] will have a drip pad under your nose to collect mucus and blood. Change it only when it bleeds through. You may have to do this every hour for 24 hours after surgery. You may have some swelling of your nose, upper lip, cheeks, or around your eyes. Your nose may be sore and will bleed. You may feel \"stuffed up\" like you have a bad head cold. This will last for several days after surgery. The tip of your nose and your upper lip and gums may be numb. Feeling will return in a few weeks to a few months. Your sense of smell will not be as good after surgery. It will improve and probably return to normal in 1 to 2 months. You will probably be able to return to work or school in about 1 week and to your normal routine in about 3 weeks. However, this varies with your job and the extent of your surgery. Most people feel normal in 1 to 2 months. You will have to visit your doctor regularly for 3 to 4 months after your surgery. Your doctor will check to see that your sinuses are healing well. This care sheet gives you a general idea about how long it will take for you to recover. But each person recovers at a different pace. Follow the steps below to get better as quickly as possible. How can you care for yourself at home? Activity  · Rest when you feel tired. Getting enough sleep will help you recover. Do not lie flat. Raise your head with three or four pillows. This can reduce swelling. Try to sleep on your back during the month after surgery. You can also sleep in a reclining chair. · Try to walk each day. Start by walking a little more than you did the day before. Bit by bit, increase the amount you walk. Walking boosts blood flow and helps prevent pneumonia and constipation. Also, try to sit and stand as much as you can. · For 1 week, try not to bend over or lift anything heavier than 10 pounds.  This may include a child, heavy grocery bags and milk containers, a heavy briefcase or backpack, cat litter or dog food bags, or a vacuum . · You can take a shower or bath. Use lukewarm, not hot water. Avoid swimming for 6 weeks. · Avoid sawdust, chemicals, and excessive dust for 4 weeks. · Avoid strenuous activities, such as biking, jogging, weight lifting, or aerobic exercise, for 1 week and then ease back into these activities over 2 to 3 weeks. · You may drive when you are no longer taking prescription pain medicine and feel up to it. · You will probably be able to return to work or school in about 1 week and to your normal routine in about 3 weeks. However, this varies with your job and the extent of your surgery. Diet  · You can eat your normal diet. If your stomach is upset, try bland, low-fat foods like plain rice, broiled chicken, toast, and yogurt. · You may notice that your bowel movements are not regular right after your surgery. This is common. Try to avoid constipation and straining with bowel movements. You may want to take a fiber supplement every day. If you have not had a bowel movement after a couple of days, ask your doctor about taking a mild laxative. Medicines  · Your doctor will tell you if and when you can restart your medicines. He or she will also give you instructions about taking any new medicines. · If you take blood thinners, such as warfarin (Coumadin), clopidogrel (Plavix), or aspirin, be sure to talk to your doctor. He or she will tell you if and when to start taking those medicines again. Make sure that you understand exactly what your doctor wants you to do. · Do not take aspirin, aspirin-containing medicines, or anti-inflammatory medicines such as ibuprofen (Advil, Motrin) or naproxen (Aleve) for 3 weeks following surgery unless your doctor says it is okay. · Take pain medicines exactly as directed. ¨ If the doctor gave you a prescription medicine for pain, take it as prescribed.   ¨ Do not take two or more pain medicines at the same time unless the doctor told you to. Many pain medicines have acetaminophen, which is Tylenol. Too much acetaminophen (Tylenol) can be harmful. · If your doctor prescribed antibiotics, take them as directed. Do not stop taking them just because you feel better. You need to take the full course of antibiotics. · If you think your pain medicine is making you sick to your stomach:  ¨ Take your medicine after meals (unless your doctor has told you not to). ¨ Ask your doctor for a different pain medicine. Incision care  · You probably will not have an incision (cut). You will have a drip pad under your nose to collect blood. Change it only when it has bled through. You may have to do this every hour for 24 hours after surgery. When bleeding stops, you can remove it. · If you have packing in your nose, leave it in. Your doctor will take it out. Ice and elevation  · To help with swelling and pain, put ice or a cold pack on your nose for 10 to 20 minutes at a time. Put a thin cloth between the ice and your skin. · Do not sleep flat. Sleep with your head raised up. You can also sleep in a reclining chair. Other instructions  · Do not blow your nose for 2 weeks. · Do not put anything into your nose. · If you must sneeze, open your mouth and sneeze naturally. · Keep your mouth clean. Rinse your mouth with salt water or an alcohol-free mouthwash after each meal and before bedtime. · After any packing is removed, use saline (saltwater) nasal washes to help keep your nasal passages open and wash out mucus and bacteria. You can buy saline nose drops at a grocery store or drugstore. · You can wear your glasses when you wish. Do not wear contacts until the day after the surgery. · Do not travel by airplane for at least 2 weeks. Your sinuses are still healing, and the changes in air pressure can affect them. Follow-up care is a key part of your treatment and safety.  Be sure to make and go to all appointments, and call your doctor if you are having problems. It's also a good idea to know your test results and keep a list of the medicines you take. When should you call for help? Call 911 anytime you think you may need emergency care. For example, call if:  · You passed out (lost consciousness). · You have severe trouble breathing. Call your doctor now or seek immediate medical care if:  · The dressing soaks through with blood and needs to be changed more than every 15 minutes. · You have a fever with a stiff neck or a severe headache. · You are sensitive to light, or you feel very sleepy or confused. · You have pain that does not get better after you take pain medicine. · You have a fever over 100°F.  · You have double or blurred vision, cannot close your eyes, or have eye pain. Watch closely for changes in your health, and be sure to contact your doctor if:  · You do not have a bowel movement after taking a laxative. Where can you learn more? Go to http://jovanny-jesus.info/. Enter V305 in the search box to learn more about \"Endoscopic Sinus Surgery: What to Expect at Home. \"  Current as of: July 29, 2016  Content Version: 11.1  © 3196-9372 [employer]. Care instructions adapted under license by [employer] (which disclaims liability or warranty for this information). If you have questions about a medical condition or this instruction, always ask your healthcare professional. [place] [address] any warranty or liability for your use of this information. Rhinoplasty: What to Expect at [address] [place] [address] is surgery to reshape your nose. It can be done to improve your appearance, fix a birth defect, or help you breathe better. You may have stitches or staples in your cuts (incisions). Stitches inside your nose and mouth will usually dissolve on their own.  If you have staples, your doctor will take these out in the first week. A bandage will cover your nose. You may have a plastic or plaster splint to protect and help keep the new shape of your nose. You may have a \"nasal drip pad\" under your nostrils to collect any blood that may drip from your nose. Your doctor will show you how to change the pad as needed. You may have packing material inside your nose to reduce bleeding and swelling. Packing and the nasal drip pad will be removed within 2 days after surgery. The splint will be removed in about a week. Your nose will be bruised and swollen, and you may get dark bruises around your eyes. The swelling may get worse before it gets better. Most of the swelling should go away in 3 to 4 weeks. You will have some pain in your nose, and you may have a headache. Your nose may be stuffy and you may have trouble breathing for a short time. The skin on the tip of your nose may be numb. You may have some itching or shooting pain as the feeling returns. If bones were broken during your surgery, you will need to avoid injury to your nose for about 3 months. In 3 to 4 weeks, you should have a good idea as to what your nose will look like. It can take up to a year to see the final result. This care sheet gives you a general idea about how long it will take for you to recover. But each person recovers at a different pace. Follow the steps below to feel better as quickly as possible. How can you care for yourself at home? Activity  · Rest when you feel tired. Getting enough sleep will help you recover. · Keep your head raised for several days after surgery. Sleep with your head up by using 2 or 3 pillows. · Try to walk each day. Start by walking a little more than you did the day before. Bit by bit, increase the amount you walk. · You will probably need to take about 1 week off from work. It depends on the type of work you do and how you feel.   · Avoid strenuous activities, such as bicycle riding, jogging, weight lifting, or aerobic exercise, for 2 to 3 weeks or until your doctor says it is okay. · Ask your doctor when you can drive again. · Do not blow your nose for at least 1 week after surgery. Wipe your nose gently with a tissue. If you need to sneeze, do it with your mouth open. · Ask your doctor when it'll be okay for you to bend over. · Do not rub your nose for 8 weeks. Use sunblock on your nose and wear a hat with a brim to avoid getting a sunburn. Put on sunblock or makeup gently. · Do not swim for a week. Diet  · You may notice that your bowel movements are not regular right after your surgery. This is common. Try to avoid constipation and straining with bowel movements. You may want to take a fiber supplement every day. If you have not had a bowel movement after a couple of days, ask your doctor about taking a mild laxative. Medicines  · Your doctor will tell you if and when you can restart your medicines. He or she will also give you instructions about taking any new medicines. · If you take blood thinners, such as warfarin (Coumadin), clopidogrel (Plavix), or aspirin, be sure to talk to your doctor. He or she will tell you if and when to start taking those medicines again. Make sure that you understand exactly what your doctor wants you to do. · Be safe with medicines. Take pain medicines exactly as directed. ¨ If the doctor gave you a prescription medicine for pain, take it as prescribed. ¨ If you are not taking a prescription pain medicine, ask your doctor if you can take an over-the-counter medicine. · If your doctor prescribed antibiotics, take them as directed. Do not stop taking them just because you feel better. You need to take the full course of antibiotics. · If you think your pain medicine is making you sick to your stomach:  ¨ Take your medicine after meals (unless your doctor has told you not to). ¨ Ask your doctor for a different pain medicine.   Incision care  · After the stitches or staples are out, you may wash the incision with soap and water and gently dry the area. · If you have strips of tape on the incision the doctor made, leave the tape on for a week or until it falls off. Or follow your doctor's instructions for removing the tape. Other instructions  · Put ice or a cold pack on your nose for 10 to 20 minutes at a time. Try to do this every 1 to 2 hours for the next 3 days (when you are awake) or until the swelling goes down. A bag of frozen peas or corn works well for this, because it molds to the shape of your face. Put a thin cloth between the ice and your skin. · Do not set glasses on your nose for 4 weeks. Instead, wrap a piece of tape around the bridge of the glasses and attach the tape to your forehead. · For 1 week, avoid wearing clothes that you pull over your head. Follow-up care is a key part of your treatment. Be sure to make and go to all appointments, and call your doctor if you are having problems. It's also a good idea to know your test results and keep a list of the medicines you take. When should you call for help? Call 911 anytime you think you may need emergency care. For example, call if:  · You passed out (lost consciousness). · You have severe trouble breathing. · You have sudden chest pain and shortness of breath, or you cough up blood. Call your doctor now or seek immediate medical care if:  · You have pain that does not get better after you take pain medicine. · You have a fever over 100°F.  · You have loose stitches, or your incision comes open. · You have bright red blood that repeatedly soaks through the nasal drip pad, or you feel that you are swallowing a lot of blood. · You have signs of infection, such as red streaks or pus from your incision. · You have a sudden increase in swelling. · You are sick to your stomach or cannot keep fluids down.   Watch closely for any changes in your health, and be sure to contact your doctor if:  · You do not have a bowel movement after taking a laxative. Where can you learn more? Go to http://jovanny-jesus.info/. Enter R561 in the search box to learn more about \"Rhinoplasty: What to Expect at Home. \"  Current as of: February 5, 2016  Content Version: 11.1  © 4635-7251 [employer]. Care instructions adapted under license by [employer] (which disclaims liability or warranty for this information). If you have questions about a medical condition or this instruction, always ask your healthcare professional. [address] any warranty or liability for your use of this information. DISCHARGE SUMMARY from Nurse    The following personal items are in your possession at time of discharge:    Dental Appliances: Other (comment) (upper front 4 crowns, bottom right crown molar)              Clothing: Other (comment) (clothing)                PATIENT INSTRUCTIONS:    After general anesthesia or intravenous sedation, for 24 hours or while taking prescription Narcotics:  · Limit your activities  · Do not drive and operate hazardous machinery  · Do not make important personal or business decisions  · Do  not drink alcoholic beverages  · If you have not urinated within 8 hours after discharge, please contact your surgeon on call. Report the following to your surgeon:  · Excessive pain, swelling, redness or odor of or around the surgical area  · Temperature over 100.5  · Nausea and vomiting lasting longer than 4 hours or if unable to take medications  · Any signs of decreased circulation or nerve impairment to extremity: change in color, persistent  numbness, tingling, coldness or increase pain  · Any questions        What to do at Home:  Recommended activity: {discharge activity:37635}, ***    If you experience any of the following symptoms ***, please follow up with ***. *  Please give a list of your current medications to your Primary Care Provider.     *  Please update this list whenever your medications are discontinued, doses are      changed, or new medications (including over-the-counter products) are added. *  Please carry medication information at all times in case of emergency situations. These are general instructions for a healthy lifestyle:    No smoking/ No tobacco products/ Avoid exposure to second hand smoke    Surgeon General's Warning:  Quitting smoking now greatly reduces serious risk to your health. Obesity, smoking, and sedentary lifestyle greatly increases your risk for illness    A healthy diet, regular physical exercise & weight monitoring are important for maintaining a healthy lifestyle    You may be retaining fluid if you have a history of heart failure or if you experience any of the following symptoms:  Weight gain of 3 pounds or more overnight or 5 pounds in a week, increased swelling in our hands or feet or shortness of breath while lying flat in bed. Please call your doctor as soon as you notice any of these symptoms; do not wait until your next office visit. Recognize signs and symptoms of STROKE:    F-face looks uneven    A-arms unable to move or move unevenly    S-speech slurred or non-existent    T-time-call 911 as soon as signs and symptoms begin-DO NOT go       Back to bed or wait to see if you get better-TIME IS BRAIN. Warning Signs of HEART ATTACK     Call 911 if you have these symptoms:   Chest discomfort. Most heart attacks involve discomfort in the center of the chest that lasts more than a few minutes, or that goes away and comes back. It can feel like uncomfortable pressure, squeezing, fullness, or pain.  Discomfort in other areas of the upper body. Symptoms can include pain or discomfort in one or both arms, the back, neck, jaw, or stomach.  Shortness of breath with or without chest discomfort.  Other signs may include breaking out in a cold sweat, nausea, or lightheadedness.   Don't wait more than five minutes to call [address]! Fast action can save your life. Calling 911 is almost always the fastest way to get lifesaving treatment. Emergency Medical Services staff can begin treatment when they arrive -- up to an hour sooner than if someone gets to the hospital by car. The discharge information has been reviewed with the {PATIENT PARENT GUARDIAN:70674}. The {PATIENT PARENT GUARDIAN:36198} verbalized understanding. Discharge medications reviewed with the {Dishcarge meds reviewed LTTW:86522} and appropriate educational materials and side effects teaching were provided.

## 2017-02-11 LAB
BACTERIA SPEC CULT: NORMAL
SERVICE CMNT-IMP: NORMAL

## 2017-02-12 LAB
BACTERIA SPEC CULT: ABNORMAL
BACTERIA SPEC CULT: ABNORMAL
GRAM STN SPEC: ABNORMAL
GRAM STN SPEC: ABNORMAL
SERVICE CMNT-IMP: ABNORMAL

## 2017-03-10 NOTE — OP NOTES
[address] [place] [facility] [address], [address]   OP NOTE       Name:  Leno Hsu   MR#:  [id number]   :  1975   Account #:  [de-identified]    Surgery Date:  2017   Date of Adm:  2017       PREOPERATIVE DIAGNOSES   1. Chronic pansinusitis. 2. Pan nasal polyposis. 3. Cleft lip nose deformity. 4. Deformity of cleft lip repair. POSTOPERATIVE DIAGNOSES   1. Chronic pansinusitis. 2. Pan nasal polyposis. 3. Cleft lip nose deformity. 4. Deformity of cleft lip repair. PROCEDURES PERFORMED   1. Major cleft lip rhinoplasty revision. 2. Unilateral cleft lip reconstruction with deconstruction/reconstruction   procedure, full-thickness. 3. Bilateral endoscopic frontal sinusotomy with tissue removal.   4. Bilateral endoscopic total ethmoidectomy. 5. Nasal polypectomy with hemostasis control, bilateral.   6. Fusion image guidance x2 hours. SURGEON: Michelle Nguyen MD    ANESTHESIA: General endotracheal tube anesthesia. COMPLICATIONS: None. ESTIMATED BLOOD LOSS: 50 mL. IMPLANTS: Bilateral frontal and ethmoid sinus Propel steroid-eluting   stents. SPECIMENS REMOVED   1. Polyps. 2. Maxillary sinus was cultured. 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 The nose itself had a septal dislocation and relative   vestibular stenosis owing to collapse of the cleft cartilaginous   structures, for which cartilaginous grafting was appropriate for   functional reconstruction. Additionally, the patient has had chronic   persistent pansinusitis with nasal polyposis presenting for revision   sinus work. Previous surgery had demonstrated polypoid disease   obstructing the nasofrontal recess and the ethmoid cavity.  The   mucociliary highway of the maxillary sinuses had previously been seen   to be disrupted from a prior surgery done at an outside office. DESCRIPTION OF PROCEDURE: In the operating room, the patient   was induced under general endotracheal tube anesthesia, following   which she was prepped and draped in sterile fashion. Lidocaine 1%   with 1:100,000 parts epinephrine was used for local infiltration and   infraorbital foramen block and throughout the case. The Fusion image   guidance was set up and registered for overhead monitor use using a   0 and 30-degree endoscope. Each frontonasal recess was inspected,   removing remnant uncinate process with the microdebrider and angled   Thru-Cut forceps to open up this recess more widely. Draw forceps   were used to remove the eggshell base of each frontal sinus, having   first used the microdebrider and draw forceps to remove obstructing   nasal polyposis in this area. A balloon probe was then inserted with a   light reflex in the forehead, inserting each balloon to 10 atmospheres at   2 different levels in both frontal sinuses. There was mucopurulent drainage from each maxillary sinus, cultured   and washed out and irrigated with normal saline. The mucociliary   highway was seen at a surgical disruption on both sides superior and   posteriorly. The ethmoid cavities were next dissected using the microdebrider to   remove remnant cells at the ethmoid strut and along the skull base,   broaching the ground lamella to the posterior ethmoid sinuses,   plucking polyps individually with Blakesley and probing and uncapping   remnant cells. On completion, Propel implants were placed in the   frontal and ethmoid sinuses with slow steroid dilution and opening of   ducts. Cleft rhinoplasty was performed making an incision in the previous   incision,  curving across the lobe columella elevating the nasal envelope in   the subnasal SMAS plane. This was challenging because of previous   scar plane.  There was a supratip graft creating a  jaylyn beak deformity   that was shaved flush. This was later used as a cap graft for better tip   definition and symmetry. The lower lateral cartilages were dissected   from the nasal septum to reposition the subluxed cleft medial lata. Through the medial crura, the nasal septum was dissected, elevated   bilateral submucoperichondrial leaflets, trimming the dislocated   portion and sitting the septum in the midline and fixing it with a figure-  of-eight suture of 3-0 chromic. A tongue and groove technique was   then used to fix the medial crura to the caudal septum with 5-0 PDS   mattress sutures to create symmetry at the nasal base where the   asymmetry of this anatomy created a vestibular stenosis. The nasal   tips were then elevated using a lateral crural steal technique in figure-  of-eight suture 5-0 PDS, including box stitches with 5-0 PDS to create   symmetry of the domes creating definition with the cap graft with   interrupted 5-0 PDS. The nasal envelope was then relayed and closed   with interrupted 5-0 plain gut sutures. The major cleft lip repair was next performed, incising and beveling the   incision from the cleft scar coming around the nasal sill on the left and   around the ala and excising the previous scar. There was a step up at   the vermilion border  crossing across this into the red vermilion of the lip. Bilateral skin flaps were elevated sharply off the orbicularis oris   muscle. The muscle could be seen to be dehiscent under this. Sequential mattress sutures of 5-0 Vicryl were then placed to repair the   dehiscence muscle snug creating an everted edge to recreate a philtral   column. A rotation advancement flap was then performed creating   better length on the cleft lip side and closing the skin of the cleft lip in 2     layers with 5-0 Vicryl deep, everting the edge and interrupted 5-0 plain   gut sutures in the skin.  At the notch on the vermilion border a Z-plasty was performed interrupting the edges to address the whistle lip   deformity closing with interrupted 5-0 chromic gut sutures through to   the vermilion side. Mastisol, Steri-Strip and Thermoplastic cast was   applied to bridge of the nose. Antibiotic ointment was applied to the lip   and the patient was then handed over to Anesthesia for awakening   and transfer to the recovery room.         MD Josue Anguiano MP   D:  03/09/2017   18:12   T:  03/09/2017   19:36   Job #:  [id number]

## 2022-08-31 ENCOUNTER — HOSPITAL ENCOUNTER (OUTPATIENT)
Dept: PHYSICAL THERAPY | Age: 47
Discharge: HOME | End: 2022-08-31
Payer: COMMERCIAL

## 2022-08-31 PROCEDURE — 97161 PT EVAL LOW COMPLEX 20 MIN: CPT

## 2022-08-31 NOTE — THERAPY EVALUATION
[address] . [address] [place] [address], [address] [place] [address]  Phone: [phone]   Fax: [phone]    PT INITIAL EVALUATION NOTE - [facility] 2-15  Plan of Care/Statement of Necessity for Physical Therapy Services  2-15    Patient Name: Salena Agudelo  Date:2022  : 1975  [x]  Patient  Verified  Provider#: [id number]  Payor: ERNESTO CAMPOS / Plan: [address] [place] [address] / Product Type: PPO /    Referral source: Artem Sterling, Dave   In time:140  Out time:240  Total Treatment Time (min): 60  Total Timed Codes (min): 0     Visit #: 1      Start of Care: 22     Onset Date: recent recurrence     Treatment Area/Diagnosis: Other low back pain [M54.59]    SUBJECTIVE  Pain Level (0-10 scale): 4                Best: 2           Worst:  8    Any medication changes, allergies to medications, adverse drug reactions, diagnosis change, or new procedure performed?: [] No    [x] Yes (see summary sheet for update)    Subjective:    LBP MAINLY LEFT SI AREA  PLOF: NO PAIN  Mechanism of Injury: INSIDIOUS OVER THE YEARS. 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 GAIT, STAIRS, TOE/HEEL RAISES WNL. STAND ON ONE LEG AND FLEX KNEE WNL LEFT BUT RIGHT A LITTLE SHAKY. SUPINE; NO LLD. KNEE ALIGNMENT WNL. OVERPRESSURE ON KNEES WNL. PROM TO90 DEGREES BILAT. ARMS OVERHEAD WNL  PRONE; PROM HIPS AND KNEES WNL.  PALPATION; MILD TTP OVER LEFT SI.  SUPINE TO SIT UP HURTS QUITE A BIT            With   [x] TE   [] TA   [] neuro   [] other: Patient Education: [x] Review HEP  PULLOVERS, LTR  [] Progressed/Changed HEP based on:   [] positioning   [] body mechanics   [] transfers   [] heat/ice application    [] other:      TUG: wnl    Pain Level (0-10 scale) post treatment: 4    ASSESSMENT/Key Information/Changes in Function:   CORE STABILIZATION EXERCISES INDICATED AND WILL BE POC    Problem List: pain affecting function, decrease strength, and decrease activity tolerance    Short Term Goals: To be accomplished in 4 weeks:   INDEPENDENT IN HEP  Long Term Goals: To be accomplished in 8 weeks:   FULL ADLs UNRESTRICTED BY LBP  Patient Goal (s): REDUCE PAIN    Evaluation Complexity History LOW Complexity : Zero comorbidities / personal factors that will impact the outcome / POC; Examination LOW Complexity : 1-2 Standardized tests and measures addressing body structure, function, activity limitation and / or participation in recreation  ;Presentation LOW Complexity : Stable, uncomplicated  ;Clinical Decision Making MEDIUM Complexity : FOTO score of 26-74  Overall Complexity Rating: LOW      Patient / Family readiness to learn indicated by: asking questions  Persons(s) to be included in education: patient (P)  Barriers to Learning/Limitations: None  Patient Self Reported Health Status: good  Rehabilitation Potential: good  Patient/ Caregiver education and instruction: exercises      PLAN:  Treatment Plan may include any combination of the following: Therapeutic exercise, Therapeutic activities, Physical agent/modality, and Manual therapy  HEP Issued: PULLOVERS; LTRs    Frequency / Duration: Patient to be seen 2 times per week for 8 weeks. [x]  Plan of care has been reviewed with PTA    Certification Period: 8/31/22  to  11/30/22    Jaren Marrero, PT 8/31/2022     ________________________________________________________________________    I certify that the above Therapy Services are being furnished while the patient is under my care.  I agree with the treatment plan and certify that this therapy is necessary.     Physician's Signature:____________________  Date:____________Time: _________            Tamela Goss, *

## 2022-09-07 ENCOUNTER — HOSPITAL ENCOUNTER (OUTPATIENT)
Dept: PHYSICAL THERAPY | Age: 47
Discharge: HOME | End: 2022-09-07
Payer: COMMERCIAL

## 2022-09-07 PROCEDURE — 97110 THERAPEUTIC EXERCISES: CPT | Performed by: PHYSICAL THERAPIST

## 2022-09-07 NOTE — PROGRESS NOTES
PT DAILY TREATMENT NOTE - [facility] -15    Patient Name: Fanny Escalona  Date:2022  : 1975  [x]  Patient  Verified  Payor: BLUE CROSS / Plan: [address] [place] [address] / Product Type: PPO /    In time:330 pm   Out time:415 pm   Total Treatment Time (min): 45 minutes  Total Timed Codes (min): 38 minutes  1:1 Treatment Time ( only): 38 minutes. Visit #:  2    Treatment Area: Other low back pain [M54.59]    SUBJECTIVE  Pain Level (0-10 scale): 4  Any medication changes, allergies to medications, adverse drug reactions, diagnosis change, or new procedure performed?: [x] No    [] Yes (see summary sheet for update)  Subjective functional status/changes:   [] No changes reported  Left low back pain persists. MRI reading next week. Would like an injection. OBJECTIVE      38 min Therapeutic Exercise:  [] See flow sheet :   Rationale: increase ROM, increase strength, and improve coordination to improve the patients ability to perform routine upright activities without increasing symptoms. With   [] TE   [] TA   [] neuro   [] other: Patient Education: [x] Review HEP  , bridge + ball squeeze, reciprocals with OTB. [] Progressed/Changed HEP based on:   [] positioning   [] body mechanics   [] transfers   [] heat/ice application    [] other:      Other Objective/Functional Measures:      Pain Level (0-10 scale) post treatment: 3    ASSESSMENT/Changes in Function:   Patient reports she was fatigued from program.  No adverse effect. Needed verbal/physical cues to perform TA activation. Patient will continue to benefit from skilled PT services to modify and progress therapeutic interventions, address strength deficits, and assess and modify postural abnormalities to attain remaining goals. [x]  See Plan of Care  []  See progress note/recertification  []  See Discharge Summary         Progress towards goals / Updated goals:  Short Term Goals:  To be accomplished in 4 weeks: INDEPENDENT IN HEP  Long Term Goals:  To be accomplished in 8 weeks:              FULL ADLs UNRESTRICTED BY LBP  Patient Goal (s): REDUCE PAIN       PLAN  [x]  Upgrade activities as tolerated     [x]  Continue plan of care  []  Update interventions per flow sheet       []  Discharge due to:_  []  Other:_      April Asher-Gracie, PT 9/7/2022

## 2022-09-12 ENCOUNTER — TRANSCRIBE ORDER (OUTPATIENT)
Dept: SCHEDULING | Age: 47
End: 2022-09-12

## 2022-09-12 DIAGNOSIS — J32.9 CHRONIC SINUSITIS: Primary | ICD-10-CM

## 2022-09-14 ENCOUNTER — HOSPITAL ENCOUNTER (OUTPATIENT)
Dept: PHYSICAL THERAPY | Age: 47
Discharge: HOME | End: 2022-09-14
Payer: COMMERCIAL

## 2022-09-14 PROCEDURE — 97110 THERAPEUTIC EXERCISES: CPT | Performed by: PHYSICAL THERAPIST

## 2022-09-14 NOTE — PROGRESS NOTES
PT DAILY TREATMENT NOTE - [facility] 2-15    Patient Name: Anette Rolon  Date:2022  : 1975  [x]  Patient  Verified  Payor: ERNESTO ANDRES / Plan: [address] [place] [address] / Product Type: PPO /    In time:330 pm   Out time:415 pm   Total Treatment Time (min): 45 minutes  Total Timed Codes (min): 40 minutes  1:1 Treatment Time ( only): 40 minutes. Visit #:  3    Treatment Area: Other low back pain [M54.59]    SUBJECTIVE  Pain Level (0-10 scale): 4  Any medication changes, allergies to medications, adverse drug reactions, diagnosis change, or new procedure performed?: [x] No    [] Yes (see summary sheet for update)  Subjective functional status/changes:   [] No changes reported  Mild soreness after the last session. OBJECTIVE      40 min Therapeutic Exercise:  [] See flow sheet :   Rationale: increase ROM, increase strength, and improve coordination to improve the patients ability to perform routine upright activities without increasing symptoms. With   [] TE   [] TA   [] neuro   [] other: Patient Education: [x] Review HEP  , knee plank/hold  [] Progressed/Changed HEP based on:   [] positioning   [] body mechanics   [] transfers   [] heat/ice application    [] other:      Other Objective/Functional Measures:      Pain Level (0-10 scale) post treatment: 3    ASSESSMENT/Changes in Function:   Patient reports she was fatigued from program.  No adverse effect. Needed verbal/physical cues to perform TA activation. Patient will continue to benefit from skilled PT services to modify and progress therapeutic interventions, address strength deficits, and assess and modify postural abnormalities to attain remaining goals. [x]  See Plan of Care  []  See progress note/recertification  []  See Discharge Summary         Progress towards goals / Updated goals:  Short Term Goals: To be accomplished in 4 weeks:              INDEPENDENT IN HEP  Long Term Goals:  To be accomplished in 8 weeks: FULL ADLs UNRESTRICTED BY LBP  Patient Goal (s): REDUCE PAIN       PLAN  [x]  Upgrade activities as tolerated     [x]  Continue plan of care  []  Update interventions per flow sheet       []  Discharge due to:_  []  Other:_      April Asher-Gracie, PT 9/14/2022

## 2022-09-22 ENCOUNTER — HOSPITAL ENCOUNTER (OUTPATIENT)
Dept: PHYSICAL THERAPY | Age: 47
Discharge: HOME | End: 2022-09-22
Payer: COMMERCIAL

## 2022-09-22 PROCEDURE — 97110 THERAPEUTIC EXERCISES: CPT | Performed by: PHYSICAL THERAPIST

## 2022-09-22 NOTE — PROGRESS NOTES
PT DAILY TREATMENT NOTE - [facility] 2-15    Patient Name: Vero Peer  Date:2022  : 1975  [x]  Patient  Verified  Payor: ERNESTO Chaffee / Plan: [address] [place] [address] / Product Type: PPO /    In time:1115 pm   Out time:1215 pm   Total Treatment Time (min): 45 minutes  Total Timed Codes (min):  38 minutes    Visit #:  4    Treatment Area: Other low back pain [M54.59]    SUBJECTIVE  Pain Level (0-10 scale): 4  Any medication changes, allergies to medications, adverse drug reactions, diagnosis change, or new procedure performed?: [x] No    [] Yes (see summary sheet for update)  Subjective functional status/changes:   [] No changes reported  Saw the spine MD.  MRI was unremarkable with degenerative changes in the lumbar spine. Said to keep coming to PT. No other treatment options needed at this time. I have not been doing any exercises at home. OBJECTIVE      38 min Therapeutic Exercise:  [] See flow sheet :   Rationale: increase ROM, increase strength, and improve coordination to improve the patients ability to perform routine upright activities without increasing symptoms. With   [] TE   [] TA   [] neuro   [] other: Patient Education: [x] Review HEP  , knee plank/hold  [] Progressed/Changed HEP based on:   [] positioning   [] body mechanics   [] transfers   [] heat/ice application    [] other:      Other Objective/Functional Measures:      Pain Level (0-10 scale) post treatment: 0    ASSESSMENT/Changes in Function:   Patient reports she was fatigued from program.  No adverse effect. Needed verbal/physical cues to perform TA activation. Reports she must be pro active in doing HEP. Will be on vacation next week. Patient will continue to benefit from skilled PT services to modify and progress therapeutic interventions, address strength deficits, and assess and modify postural abnormalities to attain remaining goals.      [x]  See Plan of Care  []  See progress note/recertification  []  See Discharge Summary         Progress towards goals / Updated goals:  Short Term Goals: To be accomplished in 4 weeks:              INDEPENDENT IN HEP  Long Term Goals:  To be accomplished in 8 weeks:              FULL ADLs UNRESTRICTED BY LBP  Patient Goal (s): REDUCE PAIN       PLAN  [x]  Upgrade activities as tolerated     [x]  Continue plan of care  []  Update interventions per flow sheet       []  Discharge due to:_  []  Other:_      April Asher-Gracie, PT 9/22/2022

## 2022-10-05 ENCOUNTER — HOSPITAL ENCOUNTER (OUTPATIENT)
Dept: PHYSICAL THERAPY | Age: 47
Discharge: HOME | End: 2022-10-05
Payer: COMMERCIAL

## 2022-10-05 PROCEDURE — 97110 THERAPEUTIC EXERCISES: CPT | Performed by: PHYSICAL THERAPIST

## 2022-10-05 NOTE — PROGRESS NOTES
[address], [address] [facility]  Phone: [phone]   Fax: [phone]    Progress Note    Name: Neto Francisco   : 1975   MD: Dae Hall, *       Treatment Diagnosis: Other low back pain [M54.59]  Start of Care: 2022    Visits from Start of Care: 5  Missed Visits: 0    Summary of Care:  Core program for chronic low back pain     Assessment / Recommendations:     Progress towards goals / Updated goals:  Short Term Goals: To be accomplished in 4 weeks:              INDEPENDENT IN HEP, progressing   Long Term Goals: To be accomplished in 8 weeks:              FULL ADLs UNRESTRICTED BY LBP, progressing   Patient Goal (s): Ellie Loya, progressing           Other: continue current program.  Patient has missed time due to insurance and vacation.        Anna Manuel, PT 10/5/2022

## 2022-10-05 NOTE — PROGRESS NOTES
PT DAILY TREATMENT NOTE - [facility] 2-15    Patient Name: Sadiq Butt  CNOM:[id number]  : 1975  [x]  Patient  Verified  Payor: BLUE CROSS / Plan: [address] [place] [address] / Product Type: PPO /    In time:330  pm   Out time:515 pm   Total Treatment Time (min): 45 minutes  Total Timed Codes (min):  42 minutes    Visit #:  5    Treatment Area: Other low back pain [M54.59]    SUBJECTIVE  Pain Level (0-10 scale): 4  Any medication changes, allergies to medications, adverse drug reactions, diagnosis change, or new procedure performed?: [x] No    [] Yes (see summary sheet for update)  Subjective functional status/changes:   [] No changes reported  Patient reports that she been on vacation this past week. Did a lot of walking. OBJECTIVE      42 min Therapeutic Exercise:  [] See flow sheet :   Rationale: increase ROM, increase strength, and improve coordination to improve the patients ability to perform routine upright activities without increasing symptoms. With   [] TE   [] TA   [] neuro   [] other: Patient Education: [x] Review HEP  , knee plank/hold, TA with overhead reahc. [] Progressed/Changed HEP based on:   [] positioning   [] body mechanics   [] transfers   [] heat/ice application    [] other:      Other Objective/Functional Measures:      Pain Level (0-10 scale) post treatment: 0    ASSESSMENT/Changes in Function:   Patient reports she was fatigued from program.  No adverse effect. Needed verbal/physical cues to perform TA activation. Reports she must be pro active in doing HEP. Patient will continue to benefit from skilled PT services to modify and progress therapeutic interventions, address strength deficits, and assess and modify postural abnormalities to attain remaining goals. [x]  See Plan of Care  []  See progress note/recertification  []  See Discharge Summary         Progress towards goals / Updated goals:  Short Term Goals:  To be accomplished in 4 weeks: INDEPENDENT IN HEP  Long Term Goals:  To be accomplished in 8 weeks:              FULL ADLs UNRESTRICTED BY LBP  Patient Goal (s): REDUCE PAIN       PLAN  [x]  Upgrade activities as tolerated     [x]  Continue plan of care  []  Update interventions per flow sheet       []  Discharge due to:_  []  Other:_      April Asher-Gracie, PT 10/5/2022

## 2022-10-13 ENCOUNTER — HOSPITAL ENCOUNTER (OUTPATIENT)
Dept: PHYSICAL THERAPY | Age: 47
Discharge: HOME | End: 2022-10-13
Payer: COMMERCIAL

## 2022-10-13 PROCEDURE — 97110 THERAPEUTIC EXERCISES: CPT | Performed by: PHYSICAL THERAPIST

## 2022-10-13 NOTE — PROGRESS NOTES
PT DAILY TREATMENT NOTE - [facility] 2-15    Patient Name: Yandel Peterson  JSQV:  : 1975  [x]  Patient  Verified  Payor: ERNESTO ANDRES / Plan: [address] [place] [address] / Product Type: PPO /    In time: 930 am   Out time 1030 am   Total Treatment Time (min): 60 minutes  Total Timed Codes (min):  48 minutes    Visit #:  6    Treatment Area: Other low back pain [M54.59]    SUBJECTIVE  Pain Level (0-10 scale): 3  Any medication changes, allergies to medications, adverse drug reactions, diagnosis change, or new procedure performed?: [x] No    [] Yes (see summary sheet for update)  Subjective functional status/changes:   [] No changes reported    I did the knee plank and the TA at home. Not sure if I did them. OBJECTIVE      48 min Therapeutic Exercise:  [] See flow sheet :   Rationale: increase ROM, increase strength, and improve coordination to improve the patients ability to perform routine upright activities without increasing symptoms. With   [] TE   [] TA   [] neuro   [] other: Patient Education: [x] Review HEP  , knee plank/hold, TA with overhead reahc. [] Progressed/Changed HEP based on:   [] positioning   [] body mechanics   [] transfers   [] heat/ice application    [] other:      Other Objective/Functional Measures:      Pain Level (0-10 scale) post treatment: 1 , feels better after PT     ASSESSMENT/Changes in Function:   Patient reports she was fatigued from program.  No adverse effect. Needs a moderate amount verbal/physical cues to perform TA activation. Reports she must be pro active in doing HEP. Patient will continue to benefit from skilled PT services to modify and progress therapeutic interventions, address strength deficits, and assess and modify postural abnormalities to attain remaining goals. [x]  See Plan of Care  []  See progress note/recertification  []  See Discharge Summary         Progress towards goals / Updated goals:  Short Term Goals:  To be accomplished in 4 weeks:              INDEPENDENT IN HEP  Long Term Goals:  To be accomplished in 8 weeks:              FULL ADLs UNRESTRICTED BY LBP  Patient Goal (s): REDUCE PAIN       PLAN  [x]  Upgrade activities as tolerated     [x]  Continue plan of care  []  Update interventions per flow sheet       []  Discharge due to:_  []  Other:_      April Asher-Gracie, PT 10/13/2022

## 2022-10-19 ENCOUNTER — HOSPITAL ENCOUNTER (OUTPATIENT)
Dept: PHYSICAL THERAPY | Age: 47
Discharge: HOME | End: 2022-10-19
Payer: COMMERCIAL

## 2022-10-19 PROCEDURE — 97110 THERAPEUTIC EXERCISES: CPT | Performed by: PHYSICAL THERAPIST

## 2022-10-19 NOTE — PROGRESS NOTES
PT DAILY TREATMENT NOTE - [facility] 2-15    Patient Name: Jf Díaz  IRYT:  : 1975  [x]  Patient  Verified  Payor: ERNESTO ANDRES / Plan: [address] [place] [address] / Product Type: PPO /    In time: 330 pm    Out time 430 pm  Total Treatment Time (min): 60 minutes  Total Timed Codes (min):  42 minutes    Visit #:  7    Treatment Area: Other low back pain [M54.59]    SUBJECTIVE  Pain Level (0-10 scale): 3  Any medication changes, allergies to medications, adverse drug reactions, diagnosis change, or new procedure performed?: [x] No    [] Yes (see summary sheet for update)  Subjective functional status/changes:   [] No changes reported    I did not do any exercises at home. I know I need to make time. OBJECTIVE      42 min Therapeutic Exercise:  [] See flow sheet :   Rationale: increase ROM, increase strength, and improve coordination to improve the patients ability to perform routine upright activities without increasing symptoms. With   [] TE   [] TA   [] neuro   [] other: Patient Education: [x] Review HEP  , knee plank/hold, TA with overhead reach  [] Progressed/Changed HEP based on:   [] positioning   [] body mechanics   [] transfers   [] heat/ice application    [] other:      Other Objective/Functional Measures:      Pain Level (0-10 scale) post treatment: 2 , feels better after PT     ASSESSMENT/Changes in Function: Persistent inability to recall exercises from prior session. No home f/u  Patient reports she was fatigued from program.  No adverse effect. Needs a moderate amount verbal/physical cues to perform TA activation. Reports she must be pro active in doing HEP. Patient will continue to benefit from skilled PT services to modify and progress therapeutic interventions, address strength deficits, and assess and modify postural abnormalities to attain remaining goals.      [x]  See Plan of Care  []  See progress note/recertification  []  See Discharge Summary Progress towards goals / Updated goals:  Short Term Goals: To be accomplished in 4 weeks:              INDEPENDENT IN HEP  Long Term Goals:  To be accomplished in 8 weeks:              FULL ADLs UNRESTRICTED BY LBP  Patient Goal (s): REDUCE PAIN       PLAN  [x]  Upgrade activities as tolerated     [x]  Continue plan of care  []  Update interventions per flow sheet       []  Discharge due to:_  []  Other:_      April Asher-Gracie, PT 10/19/2022

## 2022-10-27 ENCOUNTER — HOSPITAL ENCOUNTER (OUTPATIENT)
Dept: PHYSICAL THERAPY | Age: 47
Discharge: HOME | End: 2022-10-27
Payer: COMMERCIAL

## 2022-10-27 PROCEDURE — 97110 THERAPEUTIC EXERCISES: CPT

## 2022-10-27 NOTE — PROGRESS NOTES
PT DAILY TREATMENT NOTE - [facility] 2-15    Patient Name: Sadiq Butt  BLPF:  : 1975  [x]  Patient  Verified  Payor: ERNESTO ANDRES / Plan: [address] [place] [address] / Product Type: PPO /    In time: 905 am    Out time 945 am  Total Treatment Time (min): 40 minutes  Total Timed Codes (min):  38 minutes    Visit #:  8    Treatment Area: Other low back pain [M54.59]    SUBJECTIVE  Pain Level (0-10 scale): 4  Any medication changes, allergies to medications, adverse drug reactions, diagnosis change, or new procedure performed?: [x] No    [] Yes (see summary sheet for update)  Subjective functional status/changes:   [] No changes reported  Frustrated that she doesn't feel like she has made progress. OBJECTIVE      38 min Therapeutic Exercise:  [] See flow sheet :Includes time spent observing response to exercise and discussing care. Rationale: increase ROM, increase strength, and improve coordination to improve the patients ability to perform routine upright activities without increasing symptoms. With   [x] TE   [] TA   [] neuro   [] other: Patient Education: [x] Review HEP  ,seated UE reach,   [] Progressed/Changed HEP based on:   [] positioning   [] body mechanics   [] transfers   [] heat/ice application    [] other:      Other Objective/Functional Measures:      Pain Level (0-10 scale) post treatment: 2 , feels better after PT     ASSESSMENT/Changes in Function: Frustrated with lack of progress. Needs to be  better with HEP - try new HEP. Better with TA activation. Patient will continue to benefit from skilled PT services to modify and progress therapeutic interventions, address strength deficits, and assess and modify postural abnormalities to attain remaining goals. [x]  See Plan of Care  []  See progress note/recertification  []  See Discharge Summary         Progress towards goals / Updated goals:  Short Term Goals:  To be accomplished in 4 weeks: INDEPENDENT IN HEP  Long Term Goals:  To be accomplished in 8 weeks:              FULL ADLs UNRESTRICTED BY LBP  Patient Goal (s): REDUCE PAIN       PLAN  [x]  Upgrade activities as tolerated     [x]  Continue plan of care  []  Update interventions per flow sheet       []  Discharge due to:_  []  Other:_      Reba Cherry, PT 10/27/2022

## 2022-10-27 NOTE — PROGRESS NOTES
PT PROGRESS NOTE - [facility] 2-15    Patient Name: Gena Quinn  WQBM:[id number]  : 1975  Visit #:  8    Treatment Area: Other low back pain [M54.59]    SUBJECTIVE  Pain Level (0-10 scale): 4  Frustrated that she doesn't feel like she has made progress. OBJECTIVE  Other Objective/Functional Measures:     Pain level remains consistent, but improves after PT session    Pain Level (0-10 scale) post treatment: 2 , feels better after PT     ASSESSMENT/Changes in Function: Frustrated with lack of progress. Needs to be  better with HEP - try new HEP. Better with TA activation. Patient will continue to benefit from skilled PT services to modify and progress therapeutic interventions, address strength deficits, and assess and modify postural abnormalities to attain remaining goals. [x]  See Plan of Care  []  See progress note/recertification  []  See Discharge Summary         Progress towards goals / Updated goals:  Short Term Goals: To be accomplished in 4 weeks:              INDEPENDENT IN HEP  Long Term Goals:  To be accomplished in 8 weeks:              FULL ADLs UNRESTRICTED BY LBP  Patient Goal (s): REDUCE PAIN       PLAN  [x]  Upgrade activities as tolerated     [x]  Continue plan of care  []  Update interventions per flow sheet       []  Discharge due to:_  []  Other:_      Sanchez Tate, PT 10/27/2022

## 2022-10-28 ENCOUNTER — HOSPITAL ENCOUNTER (OUTPATIENT)
Dept: CT IMAGING | Age: 47
Discharge: HOME | End: 2022-10-28
Attending: SPECIALIST
Payer: COMMERCIAL

## 2022-10-28 DIAGNOSIS — J32.9 CHRONIC SINUSITIS: ICD-10-CM

## 2022-10-28 PROCEDURE — 70486 CT MAXILLOFACIAL W/O DYE: CPT

## 2022-11-01 ENCOUNTER — HOSPITAL ENCOUNTER (OUTPATIENT)
Dept: PREADMISSION TESTING | Age: 47
Discharge: HOME | End: 2022-11-01
Payer: COMMERCIAL

## 2022-11-01 VITALS
DIASTOLIC BLOOD PRESSURE: 75 MMHG | WEIGHT: 216.49 LBS | SYSTOLIC BLOOD PRESSURE: 117 MMHG | OXYGEN SATURATION: 97 % | HEART RATE: 99 BPM | RESPIRATION RATE: 12 BRPM | HEIGHT: 68 IN | TEMPERATURE: 97.1 F | BODY MASS INDEX: 32.81 KG/M2

## 2022-11-01 LAB
ANION GAP SERPL CALC-SCNC: 4 MMOL/L (ref 5–15)
ATRIAL RATE: 81 BPM
BUN SERPL-MCNC: 16 MG/DL (ref 6–20)
BUN/CREAT SERPL: 18 (ref 12–20)
CALCIUM SERPL-MCNC: 8.7 MG/DL (ref 8.5–10.1)
CALCULATED P AXIS, ECG09: 46 DEGREES
CALCULATED R AXIS, ECG10: 13 DEGREES
CALCULATED T AXIS, ECG11: 29 DEGREES
CHLORIDE SERPL-SCNC: 104 MMOL/L (ref 97–108)
CO2 SERPL-SCNC: 32 MMOL/L (ref 21–32)
CREAT SERPL-MCNC: 0.91 MG/DL (ref 0.55–1.02)
DIAGNOSIS, 93000: NORMAL
GLUCOSE SERPL-MCNC: 97 MG/DL (ref 65–100)
P-R INTERVAL, ECG05: 164 MS
POTASSIUM SERPL-SCNC: 4.4 MMOL/L (ref 3.5–5.1)
Q-T INTERVAL, ECG07: 376 MS
QRS DURATION, ECG06: 82 MS
QTC CALCULATION (BEZET), ECG08: 436 MS
SODIUM SERPL-SCNC: 140 MMOL/L (ref 136–145)
VENTRICULAR RATE, ECG03: 81 BPM

## 2022-11-01 PROCEDURE — 93005 ELECTROCARDIOGRAM TRACING: CPT

## 2022-11-01 PROCEDURE — 36415 COLL VENOUS BLD VENIPUNCTURE: CPT

## 2022-11-01 PROCEDURE — 80048 BASIC METABOLIC PNL TOTAL CA: CPT

## 2022-11-01 RX ORDER — CLOMIPRAMINE HYDROCHLORIDE 25 MG/1
100 CAPSULE ORAL 2 TIMES DAILY
COMMUNITY

## 2022-11-01 RX ORDER — METOPROLOL SUCCINATE 25 MG/1
25 TABLET, EXTENDED RELEASE ORAL
COMMUNITY

## 2022-11-01 NOTE — PERIOP NOTES
[address], [address] [facility]   MAIN OR                                  [phone]   MAIN PRE OP                          [phone]                                                                                AMBULATORY PRE OP          [phone]  PRE-ADMISSION TESTING    [phone]     Surgery Date:  11/17/2022, Thursday       Is surgery arrival time given by surgeon? NO  If NO, [facility] staff will call you between 3 and 7pm the day before your surgery with your arrival time. (If your surgery is on a Monday, we will call you the Friday before.)    Call [phone] after 7pm Monday-Friday if you did not receive this call. INSTRUCTIONS BEFORE YOUR SURGERY   When You  Arrive Arrive at the 2nd [address] [facility] on the day of your surgery  Have your insurance card, photo ID, and any copayment (if needed)   Food   and   Drink NO food or drink after midnight the night before surgery    This means NO water, gum, mints, coffee, juice, etc.  No alcohol (beer, wine, liquor) 24 hours before and after surgery   Medications to   TAKE   Morning of Surgery MEDICATIONS TO TAKE THE MORNING OF SURGERY WITH A SIP OF WATER:   clomipramine   Medications  To  STOP      7 days before surgery Non-Steroidal anti-inflammatory Drugs (NSAID's): for example, Ibuprofen (Advil, Motrin), Naproxen (Aleve)  Aspirin, if taking for pain   Herbal supplements, vitamins, and fish oil  Other:  (Pain medications not listed above, including Tylenol may be taken)   Blood  Thinners If you take  Aspirin, Plavix, Coumadin, or any blood-thinning or anti-blood clot medicine, talk to the doctor who prescribed the medications for pre-operative instructions.    Bathing Clothing  Jewelry  Valuables     If you shower the morning of surgery, please do not apply anything to your skin (lotions, powders, deodorant, or makeup, especially mascara)  Follow [employer] body wash instructions provided to you during PAT appointment. Begin 3 days prior to surgery. Do not shave or trim anywhere 24 hours before surgery  Wear your hair loose or down; no pony-tails, buns, or metal hair clips  Wear loose, comfortable, clean clothes  Wear glasses instead of contacts  Leave money, valuables, and jewelry, including body piercings, at home     Going Home - or Spending the Night SAME-DAY SURGERY: You must have a responsible adult drive you home and stay with you 24 hours after surgery  ADMITS: If your doctor is keeping you in the hospital after surgery, leave personal belongings/luggage in your car until you have a hospital room number. Hospital discharge time is 12 noon  Drivers must be here before 12 noon unless you are told differently   Special Instructions  Please bring updated med list day of surgery. Shower daily with antibacterial soap each day starting 3 days prior to surgery       Follow all instructions so your surgery wont be cancelled. Please, be on time. If a situation occurs and you are delayed the day of surgery, call [phone] or [phone]. If your physical condition changes (like a fever, cold, flu, etc.) call your surgeon. Home medication(s) reviewed and verified verbally with list during PAT appointment. The patient was contacted in person. The patient verbalizes understanding of all instructions and does not need reinforcement.

## 2022-11-02 ENCOUNTER — APPOINTMENT (OUTPATIENT)
Dept: PHYSICAL THERAPY | Age: 47
End: 2022-11-02

## 2022-11-16 ENCOUNTER — ANESTHESIA EVENT (OUTPATIENT)
Dept: SURGERY | Age: 47
End: 2022-11-16
Payer: COMMERCIAL

## 2022-11-17 ENCOUNTER — HOSPITAL ENCOUNTER (OUTPATIENT)
Age: 47
Setting detail: OUTPATIENT SURGERY
Discharge: HOME | End: 2022-11-17
Attending: SPECIALIST | Admitting: SPECIALIST
Payer: COMMERCIAL

## 2022-11-17 ENCOUNTER — ANESTHESIA (OUTPATIENT)
Dept: SURGERY | Age: 47
End: 2022-11-17
Payer: COMMERCIAL

## 2022-11-17 VITALS
DIASTOLIC BLOOD PRESSURE: 93 MMHG | WEIGHT: 214.07 LBS | OXYGEN SATURATION: 92 % | TEMPERATURE: 98.3 F | SYSTOLIC BLOOD PRESSURE: 136 MMHG | RESPIRATION RATE: 13 BRPM | HEART RATE: 95 BPM | BODY MASS INDEX: 35.67 KG/M2 | HEIGHT: 65 IN

## 2022-11-17 DIAGNOSIS — J32.4 CHRONIC PANSINUSITIS: Primary | ICD-10-CM

## 2022-11-17 LAB — HCG UR QL: NEGATIVE

## 2022-11-17 PROCEDURE — 76210000034 HC AMBSU PH I REC 0.5 TO 1 HR: Performed by: SPECIALIST

## 2022-11-17 PROCEDURE — 77030008528 HC TBNG IRR MIC/DEB MEDT -B: Performed by: SPECIALIST

## 2022-11-17 PROCEDURE — 74011250636 HC RX REV CODE- 250/636: Performed by: NURSE ANESTHETIST, CERTIFIED REGISTERED

## 2022-11-17 PROCEDURE — 74011000250 HC RX REV CODE- 250: Performed by: NURSE ANESTHETIST, CERTIFIED REGISTERED

## 2022-11-17 PROCEDURE — 77030019908 HC STETH ESOPH SIMS -A: Performed by: NURSE ANESTHETIST, CERTIFIED REGISTERED

## 2022-11-17 PROCEDURE — 77030002888 HC SUT CHRMC J&J -A: Performed by: SPECIALIST

## 2022-11-17 PROCEDURE — 76030000020 HC AMB SURG 1.5 TO 2 HR INTENSV-TIER 1: Performed by: SPECIALIST

## 2022-11-17 PROCEDURE — 74011250636 HC RX REV CODE- 250/636: Performed by: SPECIALIST

## 2022-11-17 PROCEDURE — 77030026438 HC STYL ET INTUB CARD -A: Performed by: NURSE ANESTHETIST, CERTIFIED REGISTERED

## 2022-11-17 PROCEDURE — 74011250636 HC RX REV CODE- 250/636: Performed by: ANESTHESIOLOGY

## 2022-11-17 PROCEDURE — 2709999900 HC NON-CHARGEABLE SUPPLY: Performed by: SPECIALIST

## 2022-11-17 PROCEDURE — 76060000063 HC AMB SURG ANES 1.5 TO 2 HR: Performed by: SPECIALIST

## 2022-11-17 PROCEDURE — 77030006908 HC BLD SNUS SHV MEDT -D: Performed by: SPECIALIST

## 2022-11-17 PROCEDURE — 81025 URINE PREGNANCY TEST: CPT

## 2022-11-17 PROCEDURE — 77030008684 HC TU ET CUF COVD -B: Performed by: NURSE ANESTHETIST, CERTIFIED REGISTERED

## 2022-11-17 PROCEDURE — 77030041022 HC TRKR INSTR ENT NAV MEDT -C: Performed by: SPECIALIST

## 2022-11-17 PROCEDURE — 88305 TISSUE EXAM BY PATHOLOGIST: CPT

## 2022-11-17 PROCEDURE — 77030002996 HC SUT SLK J&J -A: Performed by: SPECIALIST

## 2022-11-17 PROCEDURE — 76210000050 HC AMBSU PH II REC 0.5 TO 1 HR: Performed by: SPECIALIST

## 2022-11-17 RX ORDER — LIDOCAINE HYDROCHLORIDE 10 MG/ML
0.1 INJECTION, SOLUTION EPIDURAL; INFILTRATION; INTRACAUDAL; PERINEURAL AS NEEDED
Status: DISCONTINUED | OUTPATIENT
Start: 2022-11-17 | End: 2022-11-17 | Stop reason: HOSPADM

## 2022-11-17 RX ORDER — SUCCINYLCHOLINE CHLORIDE 20 MG/ML
INJECTION INTRAMUSCULAR; INTRAVENOUS AS NEEDED
Status: DISCONTINUED | OUTPATIENT
Start: 2022-11-17 | End: 2022-11-17 | Stop reason: HOSPADM

## 2022-11-17 RX ORDER — SODIUM CHLORIDE, SODIUM LACTATE, POTASSIUM CHLORIDE, CALCIUM CHLORIDE 600; 310; 30; 20 MG/100ML; MG/100ML; MG/100ML; MG/100ML
150 INJECTION, SOLUTION INTRAVENOUS CONTINUOUS
Status: DISCONTINUED | OUTPATIENT
Start: 2022-11-17 | End: 2022-11-17 | Stop reason: HOSPADM

## 2022-11-17 RX ORDER — ROCURONIUM BROMIDE 10 MG/ML
INJECTION, SOLUTION INTRAVENOUS AS NEEDED
Status: DISCONTINUED | OUTPATIENT
Start: 2022-11-17 | End: 2022-11-17 | Stop reason: HOSPADM

## 2022-11-17 RX ORDER — SODIUM CHLORIDE, SODIUM LACTATE, POTASSIUM CHLORIDE, CALCIUM CHLORIDE 600; 310; 30; 20 MG/100ML; MG/100ML; MG/100ML; MG/100ML
125 INJECTION, SOLUTION INTRAVENOUS CONTINUOUS
Status: DISCONTINUED | OUTPATIENT
Start: 2022-11-17 | End: 2022-11-17 | Stop reason: HOSPADM

## 2022-11-17 RX ORDER — HYDROMORPHONE HYDROCHLORIDE 1 MG/ML
0.5 INJECTION, SOLUTION INTRAMUSCULAR; INTRAVENOUS; SUBCUTANEOUS
Status: DISCONTINUED | OUTPATIENT
Start: 2022-11-17 | End: 2022-11-17 | Stop reason: HOSPADM

## 2022-11-17 RX ORDER — ONDANSETRON 2 MG/ML
4 INJECTION INTRAMUSCULAR; INTRAVENOUS AS NEEDED
Status: DISCONTINUED | OUTPATIENT
Start: 2022-11-17 | End: 2022-11-17 | Stop reason: HOSPADM

## 2022-11-17 RX ORDER — PHENYLEPHRINE HCL IN 0.9% NACL 0.4MG/10ML
SYRINGE (ML) INTRAVENOUS AS NEEDED
Status: DISCONTINUED | OUTPATIENT
Start: 2022-11-17 | End: 2022-11-17 | Stop reason: HOSPADM

## 2022-11-17 RX ORDER — DIPHENHYDRAMINE HYDROCHLORIDE 50 MG/ML
12.5 INJECTION, SOLUTION INTRAMUSCULAR; INTRAVENOUS AS NEEDED
Status: DISCONTINUED | OUTPATIENT
Start: 2022-11-17 | End: 2022-11-17 | Stop reason: HOSPADM

## 2022-11-17 RX ORDER — HYDROCODONE BITARTRATE AND ACETAMINOPHEN 5; 325 MG/1; MG/1
2 TABLET ORAL
Qty: 16 TABLET | Refills: 0 | Status: SHIPPED | OUTPATIENT
Start: 2022-11-17 | End: 2022-11-22

## 2022-11-17 RX ORDER — PROPOFOL 10 MG/ML
INJECTION, EMULSION INTRAVENOUS AS NEEDED
Status: DISCONTINUED | OUTPATIENT
Start: 2022-11-17 | End: 2022-11-17 | Stop reason: HOSPADM

## 2022-11-17 RX ORDER — LIDOCAINE HYDROCHLORIDE 20 MG/ML
INJECTION, SOLUTION EPIDURAL; INFILTRATION; INTRACAUDAL; PERINEURAL AS NEEDED
Status: DISCONTINUED | OUTPATIENT
Start: 2022-11-17 | End: 2022-11-17 | Stop reason: HOSPADM

## 2022-11-17 RX ORDER — ALBUTEROL SULFATE 0.83 MG/ML
2.5 SOLUTION RESPIRATORY (INHALATION) AS NEEDED
Status: DISCONTINUED | OUTPATIENT
Start: 2022-11-17 | End: 2022-11-17 | Stop reason: HOSPADM

## 2022-11-17 RX ORDER — MIDAZOLAM HYDROCHLORIDE 1 MG/ML
INJECTION, SOLUTION INTRAMUSCULAR; INTRAVENOUS AS NEEDED
Status: DISCONTINUED | OUTPATIENT
Start: 2022-11-17 | End: 2022-11-17 | Stop reason: HOSPADM

## 2022-11-17 RX ORDER — EPINEPHRINE 1 MG/ML
INJECTION INTRAMUSCULAR; INTRAVENOUS; SUBCUTANEOUS AS NEEDED
Status: DISCONTINUED | OUTPATIENT
Start: 2022-11-17 | End: 2022-11-17 | Stop reason: HOSPADM

## 2022-11-17 RX ORDER — DEXAMETHASONE SODIUM PHOSPHATE 4 MG/ML
INJECTION, SOLUTION INTRA-ARTICULAR; INTRALESIONAL; INTRAMUSCULAR; INTRAVENOUS; SOFT TISSUE AS NEEDED
Status: DISCONTINUED | OUTPATIENT
Start: 2022-11-17 | End: 2022-11-17 | Stop reason: HOSPADM

## 2022-11-17 RX ORDER — FENTANYL CITRATE 50 UG/ML
INJECTION, SOLUTION INTRAMUSCULAR; INTRAVENOUS AS NEEDED
Status: DISCONTINUED | OUTPATIENT
Start: 2022-11-17 | End: 2022-11-17 | Stop reason: HOSPADM

## 2022-11-17 RX ORDER — HYDROMORPHONE HYDROCHLORIDE 1 MG/ML
INJECTION, SOLUTION INTRAMUSCULAR; INTRAVENOUS; SUBCUTANEOUS
Status: DISCONTINUED
Start: 2022-11-17 | End: 2022-11-17 | Stop reason: HOSPADM

## 2022-11-17 RX ORDER — ONDANSETRON 2 MG/ML
INJECTION INTRAMUSCULAR; INTRAVENOUS AS NEEDED
Status: DISCONTINUED | OUTPATIENT
Start: 2022-11-17 | End: 2022-11-17 | Stop reason: HOSPADM

## 2022-11-17 RX ORDER — HYDROMORPHONE HYDROCHLORIDE 2 MG/ML
0.5 INJECTION, SOLUTION INTRAMUSCULAR; INTRAVENOUS; SUBCUTANEOUS
Status: DISCONTINUED | OUTPATIENT
Start: 2022-11-17 | End: 2022-11-17

## 2022-11-17 RX ADMIN — LIDOCAINE HYDROCHLORIDE 100 MG: 20 INJECTION, SOLUTION INTRAVENOUS at 13:48

## 2022-11-17 RX ADMIN — DEXAMETHASONE SODIUM PHOSPHATE 8 MG: 4 INJECTION, SOLUTION INTRAMUSCULAR; INTRAVENOUS at 14:07

## 2022-11-17 RX ADMIN — ONDANSETRON HYDROCHLORIDE 4 MG: 2 SOLUTION INTRAMUSCULAR; INTRAVENOUS at 14:53

## 2022-11-17 RX ADMIN — ROCURONIUM BROMIDE 20 MG: 10 INJECTION INTRAVENOUS at 14:00

## 2022-11-17 RX ADMIN — SUGAMMADEX 200 MG: 100 INJECTION, SOLUTION INTRAVENOUS at 15:10

## 2022-11-17 RX ADMIN — Medication 80 MCG: at 14:04

## 2022-11-17 RX ADMIN — FENTANYL CITRATE 50 MCG: 50 INJECTION, SOLUTION INTRAMUSCULAR; INTRAVENOUS at 13:48

## 2022-11-17 RX ADMIN — SODIUM CHLORIDE, POTASSIUM CHLORIDE, SODIUM LACTATE AND CALCIUM CHLORIDE 150 ML/HR: 600; 310; 30; 20 INJECTION, SOLUTION INTRAVENOUS at 12:12

## 2022-11-17 RX ADMIN — PROPOFOL 200 MG: 10 INJECTION, EMULSION INTRAVENOUS at 13:48

## 2022-11-17 RX ADMIN — MIDAZOLAM HYDROCHLORIDE 2 MG: 1 INJECTION, SOLUTION INTRAMUSCULAR; INTRAVENOUS at 13:42

## 2022-11-17 RX ADMIN — FENTANYL CITRATE 50 MCG: 50 INJECTION, SOLUTION INTRAMUSCULAR; INTRAVENOUS at 13:44

## 2022-11-17 RX ADMIN — Medication 80 MCG: at 14:59

## 2022-11-17 RX ADMIN — SUCCINYLCHOLINE CHLORIDE 140 MG: 20 INJECTION, SOLUTION INTRAMUSCULAR; INTRAVENOUS at 13:48

## 2022-11-17 RX ADMIN — HYDROMORPHONE HYDROCHLORIDE 0.5 MG: 2 INJECTION, SOLUTION INTRAMUSCULAR; INTRAVENOUS; SUBCUTANEOUS at 16:00

## 2022-11-17 RX ADMIN — ROCURONIUM BROMIDE 10 MG: 10 INJECTION INTRAVENOUS at 13:48

## 2022-11-17 NOTE — DISCHARGE INSTRUCTIONS
Endoscopic Sinus Surgery: Postoperative Instructions     What to Expect After Endoscopic Sinus Surgery:  Bleeding: It is normal to have some bloody discharge as well as some bloody postnasal (throat) drainage for the first 3-5 days after sinus surgery, especially after you irrigate your sinuses. If steady or heavy bleeding occurs after surgery, tilt your head back slightly and breathe through your nose gently. You may dab your nose with tissue but avoid any nose blowing. If this does not stop the bleeding you may use Afrin spray. Several sprays will usually stop any bleeding. If Afrin fails to stop steady nasal bleeding then you should call our office or the on call doctor (see contact below). Pain: You should expect some nasal and sinus pressure and pain for the first several days after surgery. This may feel like a sinus infection or a dull ache in your sinuses. Extra-strength Tylenol is often all that is needed for mild post-operative discomfort. You should avoid aspirin and NSAIDs such as Motrin, Advil, and Aleve (see below) for 3 days after surgery. If Tylenol is not sufficient to control the pain, you should use the post-operative pain medication prescribed by your doctor. Fatigue: You can expect to feel very tired for the first week after surgery. This is normal and most patients plan on taking at least 1 week off of work to recover. Every patient is different and some return to work sooner. Nasal congestion and discharge: You will have nasal congestion and discharge for the first few weeks after surgery. Your nasal passage and breathing should return to normal 2-3 weeks after surgery. Postoperative visits: You will have a certain number of postoperative visits depending on what surgery you have. During these visits we will clean your nose and sinuses of fluid and blood left behind after surgery.  These visits are very important to aid the healing process so it is essential that you attend all those scheduled for you. There is some discomfort involved with the cleaning so it is best to take a pain medication (described above) 45 minutes before your visit. What to Avoid After Endoscopic Sinus Surgery:  Nose Blowing and Straining: You should avoid straining, heavy lifting (> 30 lbs) and moderate to strong nose blowing for at least 10 days after surgery. It may be OK to blow the nose very lightly to clear any residual saline or blood. Straining or aggressive nose blowing soon after surgery may cause bleeding. You can resume 50% of your regular exercise regimen at 1 week after surgery and your normal routine 2 weeks after surgery. Aspirin or Non-steroidal Anti-inflammatory (NSAIDs) medications: Aspirin and NSAIDs such as Motrin, Advil, and Aleve should be stopped 2 weeks prior to surgery. Aspirin should be avoided for 10 days after surgery but ibuprofen and Aleve can generally be started 2-3 days after the procedure (unless otherwise advised). Steroid Nasal Sprays: If you were taking nasal steroid sprays (Flonase, Rhinocort, Nasacort, Dymista) prior to surgery you should avoid using these for at least 2 weeks after sinus surgery to allow the lining of the nose and sinuses to heal. Your doctor will tell you when it is safe to restart this medicine. Smoking: Smoking will considerably impact the healing process. Smoking will delay healing resulting in prolonged discomfort, bleeding, and possibly a poor post-operative outcome. Postoperative Care Instructions:  Nasal Saline Spray: Nasal saline mist spray can be used every 2-3 hours after surgery and can make your nose more comfortable after surgery. These sprays (Ayr, Ocean, Simple Saline) are over-the-counter medications and can be purchased in any pharmacy. Sinus Irrigations: You will start the sinus irrigations with the sinus rinse kits (NeilMed Sinus Rinse Kit) the day after surgery. This must be performed at least twice daily.  Your doctor or nurse will show you how to perform the irrigations. At first they will feel strange if you havent done them before. Soon, however, they will become quite soothing as they clean out the debris left behind in your sinuses after surgery. You can expect some bloody discharge with the irrigations for the first few days after surgery. These irrigations are critical for success after sinus surgery! When to Call After Surgery:  Fever after the day of surgery higher than 101°F   Constant clear watery discharge after the first week of surgery   Sudden visual changes or eye swelling   Severe headache or neck stiffness   Steady, brisk nose bleeding that doesnt get better after using Afrin      Long-Term Care:     Please realize that the sinuses may require 6-8 weeks for complete healing. Often there will be crusting at the healing sites that will need to be removed by the doctor. This may require multiple  visits for the first six weeks. This is normal and should not be cause for alarm. The crusts may interfere with proper breathing and healing, so it is important to keep these visits. Follow-up should be arranged for about 4-6 days after the procedure. If you have any questions, please call us at [phone]. We are always happy to answer your questions, and if you should have a problem, this number is answered 24 hours a day. DISCHARGE SUMMARY from your Nurse      PATIENT INSTRUCTIONS    After general anesthesia or intravenous sedation, for 24 hours or while taking prescription Narcotics:  Limit your activities  Do not drive and operate hazardous machinery  Do not make important personal or business decisions  Do  not drink alcoholic beverages  If you have not urinated within 8 hours after discharge, please contact your surgeon on call.     Report the following to your surgeon:  Excessive pain, swelling, redness or odor of or around the surgical area  Temperature over 100.5  Nausea and vomiting lasting longer than 4 hours or if unable to take medications  Any signs of decreased circulation or nerve impairment to extremity: change in color, persistent  numbness, tingling, coldness or increase pain  Any questions      GOOD HELP TO FIGHT AN INFECTION  Here are a few tip to help reduce the chance of getting an infection after surgery:  Wash Your Hands  Good handwashing is the most important thing you and your caregiver can do. Wash before and after caring for any wounds. Dry your hand with a clean towel. Wash with soap and water for at least 20 seconds. A TIP: sing the \"Happy Birthday\" song through one time while washing to help with the timing. Use a hand  in between washings. Shower  When your surgeon says it is OK to take a shower, use a new bar of antibacterial soap (if that is what you use, and keep that bar of soap ONLY for your use), or antibacterial body wash. Use a clean wash cloth or sponge when you bathe. Dry off with a clean towel  after every bath - be careful around any wounds, skin staples, sutures or surgical glue over/on wounds. Do not enter swimming pools, hot tubs, lakes, rivers and/or ocean until wounds are healed and your doctor/surgeon says it is OK. Use Clean Sheets  Sleep on freshly laundered sheets after your surgery. Keep the surgery site covered with a clean, dry bandage (if instructed to do so). If the bandage becomes soiled, reapply a new, dry, clean bandage. Do not allow pets to sleep with you while your wound is healing. Lifestyle Modification and Controlling Your Blood Sugar  Smoking slows wound healing. Stop smoking and limit exposure to second-hand smoke. High blood sugar slows wound healing. Eat a well-balanced diet to provide proper nutrition while healing  Monitor your blood sugar (if you are a diabetic) and take your medications as you are suppose to so you can control you blood sugar after surgery.       COUGH AND DEEP BREATHE    Breathing deeply and coughing are very important exercises to do after surgery. Deep breathing and coughing open the little air tubes and air sacks in your lungs. You take deep breaths every day. You may not even notice - it is just something you do when you sigh or yawn. It is a natural exercise you do to keep these air passages open. After surgery, take deep breaths and cough, on purpose. DIRECTIONS:  Take 10 to 15 slow deep breaths every hour while awake. Breathe in deeply, and hold it for 2 seconds. Exhale slowly through puckered lips, like blowing up a balloon. After every 4th or 5th deep breath, hug your pillow to your chest or belly and give a hard, deep cough. Yes, it will probably hurt. But doing this exercise is a very important part of healing after surgery. Take your pain medicine to help you do this exercise without too much pain. Coughing and deep breathing help prevent bronchitis and pneumonia after surgery. If you had chest or belly surgery, use a pillow as a \"hug anita\" and hold it tightly to your chest or belly when you cough. ANKLE PUMPS    Ankle pumps increase the circulation of oxygenated blood to your lower extremities and decrease your risk for circulation problems such as blood clots. They also stretch the muscles, tendons and ligaments in your foot and ankle, and prevent joint contracture in the ankle and foot, especially after surgeries on the legs. It is important to do ankle pump exercises regularly after surgery because immobility increases your risk for developing a blood clot. Your doctor may also have you take an Aspirin for the next few days as well. If your doctor did not ask you to take an Aspirin, consult with him before starting Aspirin therapy on your own. The exercise is quite simple. Slowly point your foot forward, feeling the muscles on the top of your lower leg stretch, and hold this position for 5 seconds.                   Next, pull your foot back toward you as far as possible, stretching the calf muscles, and hold that position for 5 seconds. Repeat with the other foot. Perform 10 repetitions every hour while awake for both ankles if possible (down and then up with the foot once is one repetition). You should feel gentle stretching of the muscles in your lower leg when doing this exercise. If you feel pain, or your range of motion is limited, don't push too hard. Only go the limit your joint and muscles will let you go. If you have increasing pain, progressively worsening leg warmth or swelling, STOP the exercise and call your doctor. MEDICATION AND   SIDE EFFECT GUIDE    The [facility] Insurance MEDICATION AND SIDE EFFECT GUIDE was provided to the PATIENT AND CARE PROVIDER. Information provided includes instruction about drug purpose and common side effects for the following medications:   Hydrocodone        These are general instructions for a healthy lifestyle:    *   Please give a list of your current medications to your Primary Care Provider. *   Please update this list whenever your medications are discontinued, doses are changed, or new medications (including over-the-counter products) are added. *   Please carry medication information at all times in case of emergency situations. About Smoking  No smoking / No tobacco products  Avoid exposure to second hand smoke     Surgeon General's Warning:  Quitting smoking now greatly reduces serious risk to your health. Obesity, smoking, and sedentary lifestyle greatly increases your risk for illness and disease. A healthy diet, regular physical exercise & weight monitoring are important for maintaining a healthy lifestyle.       Congestive Heart Failure  You may be retaining fluid if you have a history of heart failure or if you experience any of the following symptoms:  Weight gain of 3 pounds or more overnight or 5 pounds in a week, increased swelling in your hands or feet or shortness of breath while lying flat in bed. Please call your doctor as soon as you notice any of these symptoms; do not wait until your next office visit. Recognize signs and symptoms of STROKE:  F -  Face looks uneven  A -  Arms unable to move or move evenly  S -  Speech slurred or non-existent  T -  Time-call 911 as soon as signs and symptoms begin-DO NOT go          back to bed or wait to see if you get better-TIME IS BRAIN. Warning Signs of HEART ATTACK   Call 911 if you have these symptoms:    Chest discomfort. Most heart attacks involve discomfort in the center of the chest that lasts more than a few minutes, or that goes away and comes back. It can feel like uncomfortable pressure, squeezing, fullness, or pain. Discomfort in other areas of the upper body. Symptoms can include pain or discomfort in one or both arms, the back, neck, jaw, or stomach. Shortness of breath with or without chest discomfort. Other signs may include breaking out in a cold sweat, nausea, or lightheadedness. Don't wait more than five minutes to call 911 - MINUTES MATTER! Fast action can save your life. Calling 911 is almost always the fastest way to get lifesaving treatment. Emergency Medical Services staff can begin treatment when they arrive -- up to an hour sooner than if someone gets to the hospital by car. Learning About Coronavirus [phone])  Coronavirus [phone]): Overview  What is coronavirus (COVID-19)? The coronavirus disease (COVID-19) is caused by a virus. It is an illness that was first found in [place], [place], in December 2019. It has since spread worldwide. The virus can cause fever, cough, and trouble breathing. In severe cases, it can cause pneumonia and make it hard to breathe without help. It can cause death. Coronaviruses are a large group of viruses. They cause the common cold.  They also cause more serious illnesses like Middle East respiratory syndrome (MERS) and severe acute respiratory syndrome (SARS). COVID-19 is caused by a novel coronavirus. That means it's a new type that has not been seen in people before. This virus spreads person-to-person through droplets from coughing and sneezing. It can also spread when you are close to someone who is infected. And it can spread when you touch something that has the virus on it, such as a doorknob or a tabletop. What can you do to protect yourself from coronavirus (COVID-19)? The best way to protect yourself from getting sick is to: Avoid areas where there is an outbreak. Avoid contact with people who may be infected. Wash your hands often with soap or alcohol-based hand sanitizers. Avoid crowds and try to stay at least 6 feet away from other people. Wash your hands often, especially after you cough or sneeze. Use soap and water, and scrub for at least 20 seconds. If soap and water aren't available, use an alcohol-based hand . Avoid touching your mouth, nose, and eyes. What can you do to avoid spreading the virus to others? To help avoid spreading the virus to others:  Cover your mouth with a tissue when you cough or sneeze. Then throw the tissue in the trash. Use a disinfectant to clean things that you touch often. Stay home if you are sick or have been exposed to the virus. Don't go to school, work, or public areas. And don't use public transportation. If you are sick:  Leave your home only if you need to get medical care. But call the doctor's office first so they know you're coming. And wear a face mask, if you have one. If you have a face mask, wear it whenever you're around other people. It can help stop the spread of the virus when you cough or sneeze. Clean and disinfect your home every day. Use household  and disinfectant wipes or sprays. Take special care to clean things that you grab with your hands.  These include doorknobs, remote controls, phones, and handles on your refrigerator and microwave. And don't forget countertops, tabletops, bathrooms, and computer keyboards. When to call for help  Call 911 anytime you think you may need emergency care. For example, call if:  You have severe trouble breathing. (You can't talk at all.)  You have constant chest pain or pressure. You are severely dizzy or lightheaded. You are confused or can't think clearly. Your face and lips have a blue color. You pass out (lose consciousness) or are very hard to wake up. Call your doctor now if you develop symptoms such as:  Shortness of breath. Fever. Cough. If you need to get care, call ahead to the doctor's office for instructions before you go. Make sure you wear a face mask, if you have one, to prevent exposing other people to the virus. Where can you get the latest information? The following health organizations are tracking and studying this virus. Their websites contain the most up-to-date information. [place] also learn what to do if you think you may have been exposed to the virus. U.S. Centers for Disease Control and Prevention (CDC): The CDC provides updated news about the disease and travel advice. The website also tells you how to prevent the spread of infection. www.cdc.gov  World Health Organization [facility]): WHO offers information about the virus outbreaks. WHO also has travel advice. www.who.int  Current as of: April 1, 2020               Content Version: 12.4  © 7009-9357 [employer], [employer]orporated. Care instructions adapted under license by your healthcare professional. If you have questions about a medical condition or this instruction, always ask your healthcare professional. [address] any warranty or liability for your use of this information. The discharge information has been reviewed with the {PATIENT PARENT GUARDIAN:29406}. Any questions and concerns from the {PATIENT PARENT GUARDIAN:51448} have been addressed.   The {PATIENT PARENT GUARDIAN:70165} verbalized understanding. CONTENTS FOUND IN YOUR DISCHARGE ENVELOPE:  [x]     Surgeon and Hospital Discharge Instructions  [x]     [facility] Care Provider Card  [x]     Medication & Side Effect Guide            (your newly prescribed medications have been marked/highlighted showing the most common side effects from   the classes of drugs on your prescriptions)  [x]     Medication Prescription(s) x *** ( [] These have been sent electronically to your pharmacy by your surgeon,   - OR -       your surgeon has already provided these to you during a previous/pre-op office visit)  []     [address]  []     Physical Therapy Prescription  [x]     Follow-up Appointment Cards  []     Surgery-related Pictures/Media  []     Pain block and/or block with On-Q Catheter from Anesthesia Service (information included in your instructions above)  []     Medical device information sheets/pamphlets from their    []     School/work excuse note. []     /parent work excuse note. The following personal items collected during your admission are returned to you:   Dental Appliance: Dental Appliances: None  Vision: Visual Aid: None  Hearing Aid:    Jewelry: Jewelry: None  Clothing: Clothing: Footwear, Pants, Shirt, Socks, Undergarments  Other Valuables:  Other Valuables: None  Valuables sent to safe:

## 2022-11-17 NOTE — H&P
Otolaryngology - Head & Neck Surgery    Subjective:     History of Present Illness:   Aracelis Callahan is a 42 y.o. female of chronic sinusitis presents for revision right endoscopic sinus surgery. 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[facility])       Past Surgical History:   Procedure Laterality Date    HX  SECTION  , ,     HX HEENT  1978    cleft lip repair    HX HEENT      bone graft for lip/cleft palate    HX HEENT      nose reconstruction    HX HEENT  4423-8654    sinus surgery x4    HX HEENT  2015    SINUS SURGERY -multiple 2 in , 2 in ,     HX ORTHOPAEDIC  2008    left tmj disc removal    HX ORTHOPAEDIC  2006    right acl knee repair    HX ORTHOPAEDIC  2005    left ankle reconstruction    HX ORTHOPAEDIC  2014    left wrist reconstruction    HX RHINOPLASTY      rhinoplasty    DC BREAST SURGERY PROCEDURE UNLISTED  2008    james breast reduction      Family History   Problem Relation Age of Onset    Heart Disease Mother     OSTEOARTHRITIS Father     No Known Problems Sister     Anesth Problems Neg Hx       Social History     Tobacco Use    Smoking status: Never    Smokeless tobacco: Never   Substance Use Topics    Alcohol use: No     Allergies   Allergen Reactions    Betadine [Povidone-Iodine] Rash    Bactrim [Sulfamethoprim Ds] Rash    Tape [Adhesive] Rash     States has problem with steri strips     Prior to Admission medications    Medication Sig Start Date End Date Taking? Authorizing Provider   metoprolol succinate (TOPROL-XL) 25 mg XL tablet Take 25 mg by mouth nightly. Yes Provider, Historical   clomiPRAMINE (ANAFRANIL) 25 mg capsule Take 100 mg by mouth two (2) times a day. Yes Provider, Historical   OTHER 1 Dose by Nasal route nightly. Nasal rinse   Yes Provider, Historical   Cetirizine 10 mg cap Take 10 mg by mouth nightly. Yes Provider, Historical        Review of Systems    Objective:     Patient Vitals for the past 8 hrs:   BP Temp Pulse Resp SpO2 Height Weight   22 1210 (!) 155/96 98.4 °F (36.9 °C) (!) 103 14 100 % 5' 5\" (1.651 m) 97.1 kg (214 lb 1.1 oz)     Temp (24hrs), Av.4 °F (36.9 °C), Min:98.4 °F (36.9 °C), Max:98.4 °F (36.9 °C)    No intake/output data recorded. Physical Exam    NAD, voice wnl. Nose - clear anteriorly. Neck - soft, no mass. CV - lungs clear. RRR. There were no additional components assessed. Assessment:     Chronic sinusitis. Will proceed with revision right FESS. Risks and benefits reviewed and consent obtained. Plan:     As above.      Signed By:  Yuniel Castro MD     2022

## 2022-11-17 NOTE — BRIEF OP NOTE
Brief Postoperative Note    Patient: Rasta Nowak  YOB: 1975  MRN: [id number]    Date of Procedure: 11/17/2022     Pre-Op Diagnosis: Chronic sinusitis, unspecified location [J32.9]    Post-Op Diagnosis: Same as preoperative diagnosis. Procedure(s):  RIGHT ENDOSCOPIC SINUS SURGERY WITH IMAGE GUIDANCE (GEN/ET)    Surgeon(s):  Liza Wright MD    Surgical Assistant: None    Anesthesia: General     Estimated Blood Loss (mL): Minimal    Complications: None    Specimens:   ID Type Source Tests Collected by Time Destination   1 : RIGHT NASAL AND SINUS CONTENTS Preservative Nose  Liza Wright MD 11/17/2022 1420 Pathology        Implants: * No implants in log *    Drains: * No LDAs found *    Findings: Inflammatory tissue obstructing right frontal sinus as well as partial occlusion of right maxillary sinus. Very thick bone with some overlying inflammatory mucosa in superior ethmoid cavity.      Electronically Signed by Laly Irby MD on 11/17/2022 at 3:15 PM

## 2022-11-17 NOTE — ANESTHESIA POSTPROCEDURE EVALUATION
Procedure(s):  RIGHT ENDOSCOPIC SINUS SURGERY WITH IMAGE GUIDANCE (GEN/ET). general    Anesthesia Post Evaluation      Multimodal analgesia: multimodal analgesia used between 6 hours prior to anesthesia start to PACU discharge  Patient location during evaluation: PACU  Patient participation: complete - patient participated  Level of consciousness: awake and alert  Pain management: adequate  Airway patency: patent  Anesthetic complications: no  Cardiovascular status: acceptable  Respiratory status: acceptable  Hydration status: acceptable  Post anesthesia nausea and vomiting:  none  Final Post Anesthesia Temperature Assessment:  Normothermia (36.0-37.5 degrees C)      INITIAL Post-op Vital signs:   Vitals Value Taken Time   /89 11/17/22 1635   Temp 36.8 °C (98.3 °F) 11/17/22 1610   Pulse 95 11/17/22 1637   Resp 15 11/17/22 1637   SpO2 90 % 11/17/22 1637   Vitals shown include unvalidated device data.

## 2022-11-17 NOTE — ANESTHESIA PREPROCEDURE EVALUATION
Anesthetic History   No history of anesthetic complications            Review of Systems / Medical History  Patient summary reviewed, nursing notes reviewed and pertinent labs reviewed    Pulmonary  Within defined limits                 Neuro/Psych   Within defined limits           Cardiovascular  Within defined limits  Hypertension                   GI/Hepatic/Renal  Within defined limits              Endo/Other  Within defined limits      Arthritis     Other Findings              Physical Exam    Airway  Mallampati: II  TM Distance: > 6 cm  Neck ROM: normal range of motion   Mouth opening: Normal     Cardiovascular  Regular rate and rhythm,  S1 and S2 normal,  no murmur, click, rub, or gallop             Dental    Dentition: Bridges and Caps/crowns     Pulmonary  Breath sounds clear to auscultation               Abdominal  GI exam deferred       Other Findings            Anesthetic Plan    ASA: 2  Anesthesia type: general          Induction: Intravenous  Anesthetic plan and risks discussed with: Patient      Patient nervous about damage to dental work.   Will use video laryngoscopy

## 2022-11-18 NOTE — OP NOTES
[facility]ond[facility] Casatnon [facility] 81  OPERATIVE REPORT    Name:  Isma Eldridge  MR#:  [id number]  :  1975  ACCOUNT #:  [de-identified]  DATE OF SERVICE:  2022    PREOPERATIVE DIAGNOSIS:  Chronic rhinosinusitis. POSTOPERATIVE DIAGNOSIS:  Chronic rhinosinusitis. PROCEDURES PERFORMED:  1. Revision right endoscopic sinus surgery with image guidance with right maxillary antrostomy. 2.  Right partial ethmoidectomy. 3.  Right frontal sinusotomy. SURGEON:  Jf Lima MD    ASSISTANT:  None. ANESTHESIA:  General endotracheal.    COMPLICATIONS:  None. SPECIMENS REMOVED:  Right nasal and sinus contents    IMPLANTS:  None. DRAINS:  None. ESTIMATED BLOOD LOSS:  10 mL. INDICATIONS FOR SURGERY:  The patient is a 51-year-old female who has a very long history of chronic sinusitis. She has undergone endoscopic sinus surgery several times in the past.  She has persistent crusting and drainage from the right nasal cavity. She has been treated fairly aggressively with medical management. Following discussion regarding the management options, a decision was made to proceed with revision endoscopic sinus surgery. OPERATIVE FINDINGS:  There was some residual anterior ethmoid air cells on the right side with some bony fragments and some surrounding mucosal inflammation. The frontal sinus was  partially occluded with inflammatory tissue. In addition, the right maxillary antrostomy was significantly narrowed. There were no signs of active infection, chronic or acute. No fungal debris. The left nasal cavity did not appear significantly inflamed. DESCRIPTION OF PROCEDURE:  The patient was met in the preoperative area where the procedure was discussed in detail and an operative permit was obtained. She was then transferred to the operating room where she was placed in the supine position on the operating table.   General anesthesia was commenced and she was orotracheally intubated without difficulty. The table was rotated 180 degrees. She was positioned and draped in the usual fashion for endoscopic sinus surgery. The [employer] image guidance system was placed and calibrated without difficulty. A multidisciplinary surgical time-out was then performed. A rigid nasal endoscope was inserted bilaterally and the findings were as aforementioned. Cottonoids moistened with epinephrine were then placed bilaterally. After about 5 minutes, the pledgets on the right side were removed. The right nasal cavity was again closely inspected using a 0 degree rigid endoscope. There was some residual anterior ethmoid cells anteriorly that were taken down with the curette. Using a microdebrider, some inflammatory mucosa was resected as well. Dissection was carried up to the anterior skull base. At this point, dissection was then carried toward the frontal recess. There was some inflammatory tissue obstructing the frontal recess in the right frontal sinus. Using an upbiting forceps, the inflammatory tissue was partially resected and then the microdebrider was used to further remove this tissue. The right frontal sinus was then able to be entered and visualized fairly easily. Finally, the right maxillary antrostomy appeared to be relatively narrowed. Using a Thru-Cut [employer] forceps, the posterior aspect of the antrostomy was further resected close to the level of the posterior maxillary sinus wall. The microdebrider was then used to remove some small amount of inflammatory tissue around the opening which created a somewhat larger maxillary antrostomy. The right nasal cavity was irrigated with saline. Hemostasis appeared to be excellent. The table was returned to its original position. She was awakened and extubated without event. She was safely transferred to the [address] [place] [address] Unit. There were no known intraoperative complications.       Duong Pisano MD [email or web address]  D:  11/17/2022 16:41  T:  11/18/2022 3:37  JOB #:  [id number]

## 2023-11-02 ENCOUNTER — HOSPITAL ENCOUNTER (OUTPATIENT)
Facility: HOSPITAL | Age: 48
Discharge: HOME | End: 2023-11-02
Attending: NEUROLOGICAL SURGERY
Payer: COMMERCIAL

## 2023-11-02 DIAGNOSIS — M47.816 LUMBAR SPONDYLOSIS: ICD-10-CM

## 2023-11-02 PROCEDURE — 72148 MRI LUMBAR SPINE W/O DYE: CPT

## 2024-12-23 ENCOUNTER — HOSPITAL ENCOUNTER (EMERGENCY)
Facility: HOSPITAL | Age: 49
Discharge: HOME | End: 2024-12-23
Attending: EMERGENCY MEDICINE
Payer: COMMERCIAL

## 2024-12-23 VITALS
WEIGHT: 220 LBS | OXYGEN SATURATION: 99 % | RESPIRATION RATE: 22 BRPM | SYSTOLIC BLOOD PRESSURE: 132 MMHG | DIASTOLIC BLOOD PRESSURE: 85 MMHG | HEIGHT: 65 IN | BODY MASS INDEX: 36.65 KG/M2 | HEART RATE: 101 BPM | TEMPERATURE: 97.3 F

## 2024-12-23 DIAGNOSIS — T78.40XA ALLERGIC REACTION, INITIAL ENCOUNTER: Primary | ICD-10-CM

## 2024-12-23 PROCEDURE — 6370000000 HC RX 637 (ALT 250 FOR IP): Performed by: EMERGENCY MEDICINE

## 2024-12-23 PROCEDURE — 99283 EMERGENCY DEPT VISIT LOW MDM: CPT

## 2024-12-23 RX ORDER — CLINDAMYCIN HYDROCHLORIDE 300 MG/1
300 CAPSULE ORAL 4 TIMES DAILY
Qty: 28 CAPSULE | Refills: 0 | Status: SHIPPED | OUTPATIENT
Start: 2024-12-23 | End: 2024-12-30

## 2024-12-23 RX ORDER — DIPHENHYDRAMINE HCL 25 MG
25 CAPSULE ORAL
Status: COMPLETED | OUTPATIENT
Start: 2024-12-23 | End: 2024-12-23

## 2024-12-23 RX ADMIN — DIPHENHYDRAMINE HYDROCHLORIDE 25 MG: 25 CAPSULE ORAL at 20:13

## 2024-12-23 ASSESSMENT — PAIN - FUNCTIONAL ASSESSMENT: PAIN_FUNCTIONAL_ASSESSMENT: NONE - DENIES PAIN

## 2024-12-23 ASSESSMENT — LIFESTYLE VARIABLES
HOW OFTEN DO YOU HAVE A DRINK CONTAINING ALCOHOL: NEVER
HOW MANY STANDARD DRINKS CONTAINING ALCOHOL DO YOU HAVE ON A TYPICAL DAY: PATIENT DOES NOT DRINK

## 2024-12-24 NOTE — ED TRIAGE NOTES
Patient had sinus surgery at [facility] on Thursday, started on Doxycyline on Friday. Swelling to face since yesterday that has gotten significantly worse. No tongue swelling noted. Patient denies difficulty swallowing

## 2024-12-24 NOTE — ED PROVIDER NOTES
[facility] [place]  EMERGENCY DEPARTMENT ENCOUNTER      Pt Name: Pily San  MRN: [id number]  Birthdate 1975  Date of evaluation: 2024  Provider: Vidal Honeycutt MD      HISTORY OF PRESENT ILLNESS      49-year-old female with recent sinus surgery, discharged on doxycycline and started taking it 3 days ago presents to the emergency department chief plaint of facial swelling for the last couple of days without shortness of breath.  She has never been on doxycycline before.    The history is provided by the patient and medical records.           Nursing Notes were reviewed.    REVIEW OF SYSTEMS         Review of Systems        PAST MEDICAL HISTORY     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(HCC)          SURGICAL HISTORY       Past Surgical History:   Procedure Laterality Date    BREAST SURGERY  2008    jc breast reduction     SECTION  , ,     HEENT  2015    SINUS SURGERY -multiple 2 in , 2 in ,     HEENT  3089-2553    sinus surgery x4    HEENT      nose reconstruction    HEENT      bone graft for lip/cleft palate    HEENT      cleft lip repair    ORTHOPEDIC SURGERY      left wrist reconstruction    ORTHOPEDIC SURGERY      left ankle reconstruction    ORTHOPEDIC SURGERY  2006    right acl knee repair    ORTHOPEDIC SURGERY  2008    left tmj disc removal    RHINOPLASTY  1999    rhinoplasty         CURRENT MEDICATIONS       Previous Medications    CETIRIZINE HCL 10 MG CAPS    Take 10 mg by mouth    CLOMIPRAMINE (ANAFRANIL) 25 MG CAPSULE    Take 100 mg by mouth 2 times daily    METOPROLOL SUCCINATE (TOPROL XL) 25 MG EXTENDED RELEASE TABLET    Take 25 mg by mouth       ALLERGIES     Povidone-iodine, Adhesive tape, and Sulfa antibiotics    FAMILY HISTORY       Family History   Problem Relation Age of Onset    No Known

## (undated) DEVICE — INSULATED BLADE ELECTRODE: Brand: EDGE

## (undated) DEVICE — TUBING 3318503 4PK IRRIGATION

## (undated) DEVICE — HANDLE LT SNAP ON ULT DURABLE LENS FOR TRUMPF ALC DISPOSABLE

## (undated) DEVICE — MASTISOL ADHESIVE LIQ 2/3ML

## (undated) DEVICE — PACKING 8004008 NEURAY 200PK 25X76MM: Brand: NEURAY ®

## (undated) DEVICE — BALLOON SINUPLASTY 6X16 MM SYS RELIEVA SPINPLUS

## (undated) DEVICE — BIPOLAR FORCEPS CORD,BANANA LEADS: Brand: VALLEYLAB

## (undated) DEVICE — REM POLYHESIVE ADULT PATIENT RETURN ELECTRODE: Brand: VALLEYLAB

## (undated) DEVICE — KENDALL SCD EXPRESS SLEEVES, KNEE LENGTH, MEDIUM: Brand: KENDALL SCD

## (undated) DEVICE — EYE PADSSTERILENOT MADE WITH NATURAL RUBBER LATEXSINGLE USE ONLYDO NOT USE IF PACKAGE OPENED OR DAMAGED: Brand: CARDINAL HEALTH

## (undated) DEVICE — (D)STRIP SKN CLSR 0.5X4IN WHT --

## (undated) DEVICE — Z DISCONTINUEDSOLUTION PREP 2OZ 10% POVIDONE IOD SCR CAP BTL

## (undated) DEVICE — APPLICATOR FBR TIP L6IN COT TIP WOOD SHFT SWAB 2000 PER CA

## (undated) DEVICE — DEVON™ KNEE AND BODY STRAP 60" X 3" (1.5 M X 7.6 CM): Brand: DEVON

## (undated) DEVICE — MINOR ENT-SFMCASU: Brand: MEDLINE INDUSTRIES, INC.

## (undated) DEVICE — BLADE 1884080EM TRICUT 4MMX13CM M4 ROHS: Brand: FUSION®

## (undated) DEVICE — SUTURE CHROMIC GUT SZ 3-0 L27IN ABSRB BRN L19MM FS-2 3/8 636H

## (undated) DEVICE — TUBING 1895522 5PK STRAIGHTSHOT TO XPS: Brand: STRAIGHTSHOT®

## (undated) DEVICE — INSULATED NEEDLE ELECTRODE: Brand: EDGE

## (undated) DEVICE — SYR 10ML CTRL LR LCK NSAF LF --

## (undated) DEVICE — MEDI-VAC NON-CONDUCTIVE SUCTION TUBING: Brand: CARDINAL HEALTH

## (undated) DEVICE — NEEDLE HYPO 25GA L1.5IN BVL ORIENTED ECLIPSE

## (undated) DEVICE — SOLUTION IV 250ML 0.9% SOD CHL CLR INJ FLX BG CONT PRT CLSR

## (undated) DEVICE — INSTRUMENT TRACKER 9733533XOM ENT 1PK

## (undated) DEVICE — TELFA NON-ADHERENT ABSORBENT DRESSING: Brand: TELFA

## (undated) DEVICE — SURGICAL PROCEDURE PACK BASIN MAJ SET CUST NO CAUT

## (undated) DEVICE — PATIENT TRACKER 9734887XOM NON-INVASIVE

## (undated) DEVICE — SUTURE PDS II SZ 5-0 L18IN ABSRB UD P-3 L13MM 3/8 CIR PRIM Z493G

## (undated) DEVICE — SOLUTION IRRIG 1000ML H2O STRL BLT

## (undated) DEVICE — COTTON BALLS: Brand: DEROYAL

## (undated) DEVICE — 1200 GUARD II KIT W/5MM TUBE W/O VAC TUBE: Brand: GUARDIAN

## (undated) DEVICE — CODMAN® SURGICAL PATTIES 1" X 3" (2.54CM X 7.62CM): Brand: CODMAN®

## (undated) DEVICE — KIT,ANTI FOG,W/SPONGE & FLUID,SOFT PACK: Brand: MEDLINE

## (undated) DEVICE — INTENDED FOR TISSUE SEPARATION, AND OTHER PROCEDURES THAT REQUIRE A SHARP SURGICAL BLADE TO PUNCTURE OR CUT.: Brand: BARD-PARKER ® CARBON RIB-BACK BLADES

## (undated) DEVICE — SUT CHRMC 4-0 18IN PS2 BRN --

## (undated) DEVICE — SUT PLN 5-0 18IN P3 YEL --

## (undated) DEVICE — SOL INJ SOD CL0.9% 10ML PREFIL -- SUB B-D306546Z 30/BX $6.00

## (undated) DEVICE — DRAPE,EENT,SPLIT,STERILE: Brand: MEDLINE

## (undated) DEVICE — PACK,EENT,TURBAN DRAPE,PK II: Brand: MEDLINE

## (undated) DEVICE — COVER,MAYO STAND,STERILE: Brand: MEDLINE

## (undated) DEVICE — Z INACTIVE NO USAGE TURNOVER KIT RM CLEANOP

## (undated) DEVICE — SUTURE PERMAHAND SZ 2-0 L18IN NONABSORBABLE BLK L26MM PS 1588H

## (undated) DEVICE — SPLINT 1529010 10PK THERMASPLINT MEDIUM

## (undated) DEVICE — PAD,NON-ADHERENT,W/AD,3X4,ST,LF,1/PK: Brand: MEDLINE

## (undated) DEVICE — GLOVE ORANGE PI 7 1/2   MSG9075

## (undated) DEVICE — DEVICE INFL BLLN FOR DEFLATION OF CATH ACCLARENT

## (undated) DEVICE — BLADE 1882040 5PK INFERIOR TURB 2MM

## (undated) DEVICE — RUBBERBAND FASTENING W0.25XL3.5IN 5 PER PK

## (undated) DEVICE — Device

## (undated) DEVICE — GOWN,SIRUS,FABRNF,XL,20/CS: Brand: MEDLINE

## (undated) DEVICE — DRAPE FLD WRM W44XL66IN C6L FOR INTRATEMP SYS THERMABASIN

## (undated) DEVICE — SOL ANTI-FOG 6ML MEDC -- MEDICHOICE - CONVERT TO 358427

## (undated) DEVICE — FIRM 4CM: Brand: NASOPORE

## (undated) DEVICE — SOLUTION IV 1000ML 0.9% SOD CHL

## (undated) DEVICE — RUBBERBAND FASTENING W1/16XL3IN

## (undated) DEVICE — 40418 TRENDELENBURG ONE-STEP ARM PROTECTORS LARGE (1 PAIR): Brand: 40418 TRENDELENBURG ONE-STEP ARM PROTECTORS LARGE (1 PAIR)

## (undated) DEVICE — SUTURE VCRL SZ 5-0 L18IN ABSRB UD P-3 L13MM 3/8 CIR PRIM J493H

## (undated) DEVICE — TOWEL SURG W17XL27IN STD BLU COT NONFENESTRATED PREWASHED